# Patient Record
Sex: MALE | Race: WHITE | Employment: FULL TIME | URBAN - METROPOLITAN AREA
[De-identification: names, ages, dates, MRNs, and addresses within clinical notes are randomized per-mention and may not be internally consistent; named-entity substitution may affect disease eponyms.]

---

## 2017-02-06 ENCOUNTER — COMMUNICATION - HEALTHEAST (OUTPATIENT)
Dept: FAMILY MEDICINE | Facility: CLINIC | Age: 42
End: 2017-02-06

## 2017-03-28 ENCOUNTER — OFFICE VISIT - HEALTHEAST (OUTPATIENT)
Dept: FAMILY MEDICINE | Facility: CLINIC | Age: 42
End: 2017-03-28

## 2017-03-28 ENCOUNTER — AMBULATORY - HEALTHEAST (OUTPATIENT)
Dept: FAMILY MEDICINE | Facility: CLINIC | Age: 42
End: 2017-03-28

## 2017-03-28 DIAGNOSIS — F41.1 ANXIETY STATE: ICD-10-CM

## 2017-03-28 DIAGNOSIS — Z00.00 ROUTINE GENERAL MEDICAL EXAMINATION AT A HEALTH CARE FACILITY: ICD-10-CM

## 2017-03-28 LAB
CHOLEST SERPL-MCNC: 182 MG/DL
FASTING STATUS PATIENT QL REPORTED: YES
HDLC SERPL-MCNC: 51 MG/DL
LDLC SERPL CALC-MCNC: 114 MG/DL
TRIGL SERPL-MCNC: 84 MG/DL

## 2017-03-28 ASSESSMENT — MIFFLIN-ST. JEOR: SCORE: 1959.01

## 2017-07-05 ENCOUNTER — COMMUNICATION - HEALTHEAST (OUTPATIENT)
Dept: FAMILY MEDICINE | Facility: CLINIC | Age: 42
End: 2017-07-05

## 2017-07-05 DIAGNOSIS — F41.9 ANXIETY AND DEPRESSION: ICD-10-CM

## 2017-07-05 DIAGNOSIS — F32.A ANXIETY AND DEPRESSION: ICD-10-CM

## 2017-10-17 ENCOUNTER — COMMUNICATION - HEALTHEAST (OUTPATIENT)
Dept: FAMILY MEDICINE | Facility: CLINIC | Age: 42
End: 2017-10-17

## 2017-10-17 DIAGNOSIS — F41.9 ANXIETY AND DEPRESSION: ICD-10-CM

## 2017-10-17 DIAGNOSIS — F32.A ANXIETY AND DEPRESSION: ICD-10-CM

## 2018-01-26 ENCOUNTER — COMMUNICATION - HEALTHEAST (OUTPATIENT)
Dept: FAMILY MEDICINE | Facility: CLINIC | Age: 43
End: 2018-01-26

## 2018-01-26 DIAGNOSIS — F32.A ANXIETY AND DEPRESSION: ICD-10-CM

## 2018-01-26 DIAGNOSIS — F41.9 ANXIETY AND DEPRESSION: ICD-10-CM

## 2018-02-27 ENCOUNTER — COMMUNICATION - HEALTHEAST (OUTPATIENT)
Dept: FAMILY MEDICINE | Facility: CLINIC | Age: 43
End: 2018-02-27

## 2018-02-27 DIAGNOSIS — F32.A ANXIETY AND DEPRESSION: ICD-10-CM

## 2018-02-27 DIAGNOSIS — F41.9 ANXIETY AND DEPRESSION: ICD-10-CM

## 2018-04-17 ENCOUNTER — COMMUNICATION - HEALTHEAST (OUTPATIENT)
Dept: FAMILY MEDICINE | Facility: CLINIC | Age: 43
End: 2018-04-17

## 2018-04-17 DIAGNOSIS — F32.A ANXIETY AND DEPRESSION: ICD-10-CM

## 2018-04-17 DIAGNOSIS — F41.9 ANXIETY AND DEPRESSION: ICD-10-CM

## 2018-08-05 ENCOUNTER — COMMUNICATION - HEALTHEAST (OUTPATIENT)
Dept: FAMILY MEDICINE | Facility: CLINIC | Age: 43
End: 2018-08-05

## 2018-08-05 DIAGNOSIS — F32.A ANXIETY AND DEPRESSION: ICD-10-CM

## 2018-08-05 DIAGNOSIS — F41.9 ANXIETY AND DEPRESSION: ICD-10-CM

## 2018-11-20 ENCOUNTER — COMMUNICATION - HEALTHEAST (OUTPATIENT)
Dept: FAMILY MEDICINE | Facility: CLINIC | Age: 43
End: 2018-11-20

## 2018-11-20 DIAGNOSIS — F41.9 ANXIETY AND DEPRESSION: ICD-10-CM

## 2018-11-20 DIAGNOSIS — F32.A ANXIETY AND DEPRESSION: ICD-10-CM

## 2019-01-22 ENCOUNTER — OFFICE VISIT - HEALTHEAST (OUTPATIENT)
Dept: FAMILY MEDICINE | Facility: CLINIC | Age: 44
End: 2019-01-22

## 2019-01-22 ENCOUNTER — AMBULATORY - HEALTHEAST (OUTPATIENT)
Dept: PHARMACY | Facility: CLINIC | Age: 44
End: 2019-01-22

## 2019-01-22 DIAGNOSIS — F32.A ANXIETY AND DEPRESSION: ICD-10-CM

## 2019-01-22 DIAGNOSIS — Z00.00 ROUTINE GENERAL MEDICAL EXAMINATION AT A HEALTH CARE FACILITY: ICD-10-CM

## 2019-01-22 DIAGNOSIS — F41.9 ANXIETY AND DEPRESSION: ICD-10-CM

## 2019-01-22 LAB
CHOLEST SERPL-MCNC: 166 MG/DL
FASTING STATUS PATIENT QL REPORTED: YES
FASTING STATUS PATIENT QL REPORTED: YES
GLUCOSE BLD-MCNC: 70 MG/DL (ref 70–125)
HDLC SERPL-MCNC: 57 MG/DL
LDLC SERPL CALC-MCNC: 95 MG/DL
TRIGL SERPL-MCNC: 70 MG/DL

## 2019-01-22 RX ORDER — EPINEPHRINE 0.3 MG/.3ML
0.3 INJECTION SUBCUTANEOUS
Status: SHIPPED | COMMUNITY
Start: 2018-12-25 | End: 2022-01-31

## 2019-01-22 ASSESSMENT — MIFFLIN-ST. JEOR: SCORE: 1932.24

## 2020-03-01 ENCOUNTER — COMMUNICATION - HEALTHEAST (OUTPATIENT)
Dept: FAMILY MEDICINE | Facility: CLINIC | Age: 45
End: 2020-03-01

## 2020-03-01 DIAGNOSIS — F32.A ANXIETY AND DEPRESSION: ICD-10-CM

## 2020-03-01 DIAGNOSIS — F41.9 ANXIETY AND DEPRESSION: ICD-10-CM

## 2020-06-19 ENCOUNTER — COMMUNICATION - HEALTHEAST (OUTPATIENT)
Dept: FAMILY MEDICINE | Facility: CLINIC | Age: 45
End: 2020-06-19

## 2020-06-19 DIAGNOSIS — F32.A ANXIETY AND DEPRESSION: ICD-10-CM

## 2020-06-19 DIAGNOSIS — F41.9 ANXIETY AND DEPRESSION: ICD-10-CM

## 2020-09-21 ENCOUNTER — COMMUNICATION - HEALTHEAST (OUTPATIENT)
Dept: FAMILY MEDICINE | Facility: CLINIC | Age: 45
End: 2020-09-21

## 2020-09-21 DIAGNOSIS — F41.9 ANXIETY AND DEPRESSION: ICD-10-CM

## 2020-09-21 DIAGNOSIS — F32.A ANXIETY AND DEPRESSION: ICD-10-CM

## 2020-12-21 ENCOUNTER — COMMUNICATION - HEALTHEAST (OUTPATIENT)
Dept: FAMILY MEDICINE | Facility: CLINIC | Age: 45
End: 2020-12-21

## 2020-12-21 DIAGNOSIS — F32.A ANXIETY AND DEPRESSION: ICD-10-CM

## 2020-12-21 DIAGNOSIS — F41.9 ANXIETY AND DEPRESSION: ICD-10-CM

## 2020-12-22 ENCOUNTER — COMMUNICATION - HEALTHEAST (OUTPATIENT)
Dept: FAMILY MEDICINE | Facility: CLINIC | Age: 45
End: 2020-12-22

## 2020-12-22 DIAGNOSIS — F32.A ANXIETY AND DEPRESSION: ICD-10-CM

## 2020-12-22 DIAGNOSIS — F41.9 ANXIETY AND DEPRESSION: ICD-10-CM

## 2021-01-15 ENCOUNTER — OFFICE VISIT - HEALTHEAST (OUTPATIENT)
Dept: FAMILY MEDICINE | Facility: CLINIC | Age: 46
End: 2021-01-15

## 2021-01-15 DIAGNOSIS — F32.A ANXIETY AND DEPRESSION: ICD-10-CM

## 2021-01-15 DIAGNOSIS — M79.89 SOFT TISSUE MASS: ICD-10-CM

## 2021-01-15 DIAGNOSIS — F41.9 ANXIETY AND DEPRESSION: ICD-10-CM

## 2021-01-15 DIAGNOSIS — Z00.00 ROUTINE GENERAL MEDICAL EXAMINATION AT A HEALTH CARE FACILITY: ICD-10-CM

## 2021-01-15 LAB
CHOLEST SERPL-MCNC: 158 MG/DL
FASTING STATUS PATIENT QL REPORTED: YES
FASTING STATUS PATIENT QL REPORTED: YES
GLUCOSE BLD-MCNC: 75 MG/DL (ref 70–125)
HDLC SERPL-MCNC: 57 MG/DL
LDLC SERPL CALC-MCNC: 89 MG/DL
TRIGL SERPL-MCNC: 61 MG/DL

## 2021-01-15 RX ORDER — CITALOPRAM HYDROBROMIDE 20 MG/1
20 TABLET ORAL DAILY
Qty: 90 TABLET | Refills: 3 | Status: SHIPPED | OUTPATIENT
Start: 2021-01-15 | End: 2022-01-18

## 2021-01-15 ASSESSMENT — MIFFLIN-ST. JEOR: SCORE: 1929.52

## 2021-01-21 ASSESSMENT — PATIENT HEALTH QUESTIONNAIRE - PHQ9: SUM OF ALL RESPONSES TO PHQ QUESTIONS 1-9: 1

## 2021-05-27 ENCOUNTER — RECORDS - HEALTHEAST (OUTPATIENT)
Dept: ADMINISTRATIVE | Facility: CLINIC | Age: 46
End: 2021-05-27

## 2021-05-27 ASSESSMENT — PATIENT HEALTH QUESTIONNAIRE - PHQ9: SUM OF ALL RESPONSES TO PHQ QUESTIONS 1-9: 1

## 2021-05-28 ENCOUNTER — RECORDS - HEALTHEAST (OUTPATIENT)
Dept: ADMINISTRATIVE | Facility: CLINIC | Age: 46
End: 2021-05-28

## 2021-05-30 VITALS — BODY MASS INDEX: 32.62 KG/M2 | HEIGHT: 71 IN | WEIGHT: 233 LBS

## 2021-05-31 ENCOUNTER — RECORDS - HEALTHEAST (OUTPATIENT)
Dept: ADMINISTRATIVE | Facility: CLINIC | Age: 46
End: 2021-05-31

## 2021-06-02 VITALS — WEIGHT: 230.6 LBS | BODY MASS INDEX: 33.01 KG/M2 | HEIGHT: 70 IN

## 2021-06-05 VITALS
BODY MASS INDEX: 32.93 KG/M2 | DIASTOLIC BLOOD PRESSURE: 68 MMHG | WEIGHT: 230 LBS | OXYGEN SATURATION: 99 % | HEART RATE: 63 BPM | HEIGHT: 70 IN | SYSTOLIC BLOOD PRESSURE: 112 MMHG

## 2021-06-06 NOTE — TELEPHONE ENCOUNTER
RN cannot approve Refill Request    RN can NOT refill this medication Protocol failed and NO refill given.      Lucía Lindsay, [facility] Triage/Med Refill 3/2/2020    Requested Prescriptions   Pending Prescriptions Disp Refills     citalopram (CELEXA) 20 MG tablet [Pharmacy Med Name: CITALOPRAM 20MG TABLETS] 90 tablet 3     Sig: TAKE 1 TABLET(20 MG) BY MOUTH DAILY       [facility] Refill Protocol  Failed - 3/1/2020  7:26 PM        Failed - PCP or prescribing provider visit in last year     Last office visit with prescriber/PCP: Visit date not found OR same dept: Visit date not found OR same specialty: Visit date not found  Last physical: 1/22/2019 Last MTM visit: Visit date not found   Next visit within 3 mo: Visit date not found  Next physical within 3 mo: Visit date not found  Prescriber OR PCP: Sundar Vickers MD  Last diagnosis associated with med order: 1. Anxiety and depression  - citalopram (CELEXA) 20 MG tablet [Pharmacy Med Name: CITALOPRAM 20MG TABLETS]; TAKE 1 TABLET(20 MG) BY MOUTH DAILY  Dispense: 90 tablet; Refill: 3    If protocol passes may refill for 12 months if within 3 months of last provider visit (or a total of 15 months).

## 2021-06-09 NOTE — PROGRESS NOTES
" Patient ID: Tristan Syed is a 41 y.o. male.  /78  Pulse 61  Ht 5' 11\" (1.803 m)  Wt (!) 233 lb (105.7 kg)  SpO2 99%  BMI 32.5 kg/m2    Assessment/Plan:                   Diagnoses and all orders for this visit:    Routine general medical examination at a health care facility  -     Lipid Cascade FASTING  -     Glucose; Future  -     Glucose    Anxiety  -     Ambulatory referral to Psychology           DISCUSSION  Increase citalopram to 20 mg once daily.  Referral for cognitive behavioral therapy.  Obtain labs as above.  See additional discussion below.  Subjective:     HPI    Tristan Syed is a 41-year-old man whose medical history includes anxiety and depression on citalopram.  He is here today for a physical.  One month ago was seen in the emergency department with a elevation of blood pressure and a strange facial sensation.  He was evaluated and thought maybe his symptoms were due to a food reaction or anxiety.  Minimal workup was undertaken but it has not recurred.  We discussed this briefly today.  He does have some pain in the right foot near the MTP joint.  He reports a strange sensation in his neck when he turns his head and swallows.  He has a bump on the left shin.    Reviewed his symptomatic concerns.  We discussed his health history, family history and social history.  We discussed management of anxiety depression.  Anxiety is still a significant factor.  He is on low-dose citalopram.  We did discussion today regarding increasing the dose.  We also discussed referral for cognitive behavioral therapy.    Right foot pain in the right MTP joint for about 6 months.  No history of injury no bruising redness or swelling.    Is a cystlike structure on the left anterior shin that has been present for several years he just wants to have this reevaluated.      Review of Systems  Complete review of systems is obtained.  Other than the specific considerations noted above complete review of " "systems is negative.      Objective:   Medications:  Current Outpatient Prescriptions   Medication Sig     cetirizine (ZYRTEC) 10 MG tablet Take 10 mg by mouth daily.     citalopram (CELEXA) 20 MG tablet TAKE 1 TABLET BY MOUTH EVERY DAY (Patient taking differently: take 1/2 tablet daily)     MULTIVIT-MINS/IRON/FOLIC/LYCOP (CENTRUM ULTRA MEN'S ORAL) Take by mouth.     Allergies:  Allergies   Allergen Reactions     Augmentin [Amoxicillin-Pot Clavulanate] Nausea And Vomiting     Tobacco:   reports that he has never smoked. He does not have any smokeless tobacco history on file.    HEALTH PREVENTION    General  Dental care: Discussed the importance of regular dental care.  Eye care: Discussed importance of routine eye exams for glaucoma screening      Wt Readings from Last 3 Encounters:   03/28/17 (!) 233 lb (105.7 kg)   02/26/17 (!) 230 lb (104.3 kg)   12/10/15 (!) 225 lb (102.1 kg)     Body mass index is 32.5 kg/(m^2).        Cancer screening  Testicular cancer:is discussed and exam performed today  Skin cancer: Discussed sun burn prevention and self monitoring.      Cholesterol:   LDL Calculated (mg/dL)   Date Value   06/23/2015 101      Blood Pressure:   BP Readings from Last 3 Encounters:   03/28/17 122/78   02/26/17 148/76   01/01/16 145/81     Immunization History   Administered Date(s) Administered     DT (pediatric) 01/01/2004     Td, historic 01/01/2004     Tdap 10/20/2009     There are no preventive care reminders to display for this patient.     Physical Exam      /78  Pulse 61  Ht 5' 11\" (1.803 m)  Wt (!) 233 lb (105.7 kg)  SpO2 99%  BMI 32.5 kg/m2    General Appearance:    Alert, cooperative, no distress, appears stated age   Head:    Normocephalic, without obvious abnormality, atraumatic   Eyes:    PERRL, conjunctiva/corneas clear, EOM's intact       Ears:    Normal TM's and external ear canals, both ears   Nose:   Nares normal, septum midline, mucosa normal, no drainage    or sinus tenderness "   Throat:   Lips, mucosa, and tongue normal; teeth and gums normal   Neck:   Supple, symmetrical, trachea midline, no adenopathy;        thyroid:  No enlargement/tenderness/nodules   Lungs:     Clear to auscultation bilaterally, respirations unlabored   Chest wall/ Breast:    No tenderness or deformity   Heart:    Regular rate and rhythm, S1 and S2 normal, no murmur, rub   or gallop   Abdomen:     Soft, non-tender, bowel sounds active all four quadrants,     no masses, no organomegaly   Genitalia:   normal testicular anatomy no inguinal hernias.     Extremities:   Extremities normal, atraumatic, no cyanosis or edema   Pulses:   2+ and symmetric all extremities   Skin:  cystic structure on the left anterior shin, seems to be within the skin itself, it is smooth round freely movable.     Neurologic:   CNII-XII intact. Normal strength, sensation and reflexes       throughout

## 2021-06-11 NOTE — TELEPHONE ENCOUNTER
RN cannot approve Refill Request    RN can NOT refill this medication PCP messaged that patient is overdue for Office Visit. Last office visit: Visit date not found Last Physical: 1/22/2019 Last MTM visit: Visit date not found Last visit same specialty: Visit date not found.  Next visit within 3 mo: Visit date not found  Next physical within 3 mo: Visit date not found      Lianna Grewal, [facility] Triage/Med Refill 9/23/2020    Requested Prescriptions   Pending Prescriptions Disp Refills     citalopram (CELEXA) 20 MG tablet 90 tablet 0     Sig: Take 1 tablet (20 mg total) by mouth daily.       [facility] Refill Protocol  Failed - 9/21/2020 10:10 AM        Failed - PCP or prescribing provider visit in last year     Last office visit with prescriber/PCP: Visit date not found OR same dept: Visit date not found OR same specialty: Visit date not found  Last physical: 1/22/2019 Last MTM visit: Visit date not found   Next visit within 3 mo: Visit date not found  Next physical within 3 mo: Visit date not found  Prescriber OR PCP: Sundar Vickers MD  Last diagnosis associated with med order: 1. Anxiety and depression  - citalopram (CELEXA) 20 MG tablet; Take 1 tablet (20 mg total) by mouth daily.  Dispense: 90 tablet; Refill: 0    If protocol passes may refill for 12 months if within 3 months of last provider visit (or a total of 15 months).

## 2021-06-14 NOTE — TELEPHONE ENCOUNTER
RN cannot approve Refill Request    RN can NOT refill this medication PCP messaged that patient is overdue for Office Visit. Last office visit: Visit date not found Last Physical: 1/22/2019 Last MTM visit: Visit date not found Last visit same specialty: Visit date not found.  Next visit within 3 mo: Visit date not found  Next physical within 3 mo: Visit date not found      Lianna Grewal, [facility] Triage/Med Refill 12/24/2020    Requested Prescriptions   Pending Prescriptions Disp Refills     citalopram (CELEXA) 20 MG tablet 90 tablet 0     Sig: Take 1 tablet (20 mg total) by mouth daily.       [facility] Refill Protocol  Failed - 12/21/2020  2:52 PM        Failed - PCP or prescribing provider visit in last year     Last office visit with prescriber/PCP: Visit date not found OR same dept: Visit date not found OR same specialty: Visit date not found  Last physical: 1/22/2019 Last MTM visit: Visit date not found   Next visit within 3 mo: Visit date not found  Next physical within 3 mo: Visit date not found  Prescriber OR PCP: Sundar Vickers MD  Last diagnosis associated with med order: 1. Anxiety and depression  - citalopram (CELEXA) 20 MG tablet; Take 1 tablet (20 mg total) by mouth daily.  Dispense: 90 tablet; Refill: 0    If protocol passes may refill for 12 months if within 3 months of last provider visit (or a total of 15 months).

## 2021-06-14 NOTE — TELEPHONE ENCOUNTER
Patient has not been seen for over a year. Will send a one month supply then patient. Patient needs to set up a follow up appt.    Responded to pt via [employer].  Confirm pt reads.    Electronically signed by: Maria Eugenia Lara MD  4/8/2020

## 2021-06-14 NOTE — PROGRESS NOTES
" Patient ID: Tristan Syed is a 45 y.o. male.  /68   Pulse 63   Ht 5' 10\" (1.778 m)   Wt (!) 230 lb (104.3 kg)   SpO2 99%   BMI 33.00 kg/m      Assessment/Plan:                Diagnoses and all orders for this visit:    Routine general medical examination at a health care facility  -     Lipid Cascade  -     Glucose    Anxiety and depression  -     citalopram (CELEXA) 20 MG tablet; Take 1 tablet (20 mg total) by mouth daily.  Dispense: 90 tablet; Refill: 3    Other orders  -     Cancel: Td, Preservative Free (green label)      DISCUSSION  Check labs as above.  See additional discussion below.  Continue citalopram.  Subjective:     HPI    Tristan Syed is a generally healthy 45-year-old male.  He has a history of anxiety depression.  He takes citalopram.  Reports medication works well in conjunction with self-care including regular exercise to manage his symptoms.  PHQ-9 score today is 1.  Reports dry mouth as a side effect of citalopram which is not a significant concern for him.  He has had typically favorable cholesterol test results.  He has no history of diabetes.  Blood pressure has been ideal.  Weight is higher than ideal with body mass index consistently of just over 30.  Today we discussed considerations related to nutrition and exercise as a means to reduce weight.  Discussed immunizations, he would be due for influenza vaccine declines.  Discussed considerations related to Covid vaccine.  Discussed other routine health preventive measures no indication to initiate prostate cancer or colon cancer screening at this time.    He did bring up some concerns regarding acid reflux symptoms and we discussed dietary considerations specifically related to this as well as potentially the use of over-the-counter medications if needed.  Also discussed the potential serious nature of such symptoms if they are persistent or severe and these would require follow-up.      Is a longstanding cystic type " structure in the left anterior shin, it causes him some discomfort although it has been present and minimally changed in size he would like to have it removed.      Review of Systems  Complete review of systems is obtained.  Other than the specific considerations noted above complete review of systems is negative.          Objective:   Medications:  Current Outpatient Medications   Medication Sig     cetirizine (ZYRTEC) 10 MG tablet Take 10 mg by mouth daily.     citalopram (CELEXA) 20 MG tablet Take 1 tablet (20 mg total) by mouth daily.     MULTIVIT-MINS/IRON/FOLIC/LYCOP (CENTRUM ULTRA MEN'S ORAL) Take by mouth.     EPINEPHrine (EPIPEN 2-PELON) 0.3 mg/0.3 mL injection Inject 0.3 mg into the shoulder, thigh, or buttocks.       Allergies:  Allergies   Allergen Reactions     Augmentin [Amoxicillin-Pot Clavulanate] Nausea And Vomiting     Penicillins Nausea And Vomiting       Tobacco:   reports that he has never smoked. He has never used smokeless tobacco.    HEALTH PREVENTION    General  Dental care: Discussed the importance of regular dental care.  Eye care: Discussed importance of routine eye exams for glaucoma screening  Exercise: Discussed maintaining his regular exercise regiment.  Diet: Discussed [place] ways he can refine his diet to initiate weight loss.    Wt Readings from Last 3 Encounters:   01/15/21 (!) 230 lb (104.3 kg)   01/22/19 (!) 230 lb 9.6 oz (104.6 kg)   03/28/17 (!) 233 lb (105.7 kg)     Body mass index is 33 kg/m .    The following high BMI interventions were performed this visit: encouragement to exercise    Cholesterol:   LDL Calculated (mg/dL)   Date Value   01/22/2019 95   03/28/2017 114   06/23/2015 101      Blood Pressure:   BP Readings from Last 3 Encounters:   01/15/21 112/68   01/22/19 122/74   03/28/17 122/78     Immunization History   Administered Date(s) Administered     DT (pediatric) 01/01/2004     Td, Adult, Absorbed 01/01/2004     Td,adult,historic,unspecified 01/01/2004     Tdap  "10/20/2009, 08/01/2018     There are no preventive care reminders to display for this patient.     Physical Exam      /68   Pulse 63   Ht 5' 10\" (1.778 m)   Wt (!) 230 lb (104.3 kg)   SpO2 99%   BMI 33.00 kg/m      General Appearance:    Alert, cooperative, no distress   Eyes:   No scleral icterus or conjunctival irritation       Ears:    Normal TM's and external ear canals, both ears   Throat:   Lips, mucosa, and tongue normal; teeth and gums normal   Neck:   Supple, symmetrical, trachea midline, no adenopathy;        thyroid:  No enlargement/tenderness/nodules   Lungs:     Clear to auscultation bilaterally, respirations unlabored, no wheezes or crackles   Heart:    Regular rate and rhythm,  No murmur   Abdomen:    Soft, no distention, no tenderness on palpation, no masses, no organomegaly     Extremities:  No edema, no joint swelling or redness, no evidence of any injuries   Skin:  No concerning skin findings, no suspicious moles, no rashes   Neurologic:  On gross examination there is no motor or sensory deficit.  Patient walks with a normal gait                                   "

## 2021-06-23 NOTE — PROGRESS NOTES
" Patient ID: Tristan Syed is a 43 y.o. male.  /74   Pulse 66   Ht 5' 10\" (1.778 m)   Wt (!) 230 lb 9.6 oz (104.6 kg)   BMI 33.09 kg/m      Assessment/Plan:                   Diagnoses and all orders for this visit:    Routine general medical examination at a health care facility  -     Lipid Cascade  -     Glucose    Anxiety and depression  -     citalopram (CELEXA) 20 MG tablet  Dispense: 90 tablet; Refill: 3           DISCUSSION  Continue citalopram.  Obtain labs as above.  See additional discussion below.  Subjective:     HPI    Tristan Syed is a generally healthy 43-year-old male he is here today for a physical.  He has anxiety takes citalopram 20 mg daily.  Reports good control of anxiety symptoms.  No indication for change of medication or additional therapies at this time.  He has had favorable cholesterol and blood sugar readings as well as favorable blood pressure readings.  He does remain active.  He does not smoke.  Works a physical job.  Gets regular exercise in addition to his active job.  Tries to eat a healthy balanced diet dietary improvement as discussed.  BMI is elevated discussed the importance of continued efforts for increased exercise and improved nutrition to achieve weight loss.    Reports minor musculoskeletal concerns including mild intermittent shoulder pain without any currently.  Chronic mild waxing and waning left lateral epicondylitis symptoms.  Discussed management of these concerns briefly as well as management of occasional low back stiffness.      Review of Systems  Complete review of systems is obtained.  Other than the specific considerations noted above complete review of systems is negative.          Objective:   Medications:  Current Outpatient Medications   Medication Sig     cetirizine (ZYRTEC) 10 MG tablet Take 10 mg by mouth daily.     citalopram (CELEXA) 20 MG tablet Take 1 tablet (20 mg total) by mouth daily.     EPINEPHrine (EPIPEN 2-PELON) 0.3 mg/0.3 mL " "injection Inject 0.3 mg into the shoulder, thigh, or buttocks.     MULTIVIT-MINS/IRON/FOLIC/LYCOP (CENTRUM ULTRA MEN'S ORAL) Take by mouth.       Allergies:  Allergies   Allergen Reactions     Augmentin [Amoxicillin-Pot Clavulanate] Nausea And Vomiting     Penicillins Nausea And Vomiting       Tobacco:   reports that  has never smoked. he has never used smokeless tobacco.    HEALTH PREVENTION    General  Dental care: Discussed the importance of regular dental care.  Eye care: Discussed importance of routine eye exams for glaucoma screening  Exercise: Discussed maintaining and trying to increase the frequency of exercise  Diet: Discussed ways he can improve his diet, specifically discussed overall calorie intake, carbohydrate and sugar intake and incorporating more fruits and vegetables.    Wt Readings from Last 3 Encounters:   01/22/19 (!) 230 lb 9.6 oz (104.6 kg)   03/28/17 (!) 233 lb (105.7 kg)   02/26/17 (!) 230 lb (104.3 kg)     Body mass index is 33.09 kg/m .    The following high BMI interventions were performed this visit: encouragement to exercise      Cholesterol:   LDL Calculated (mg/dL)   Date Value   03/28/2017 114   06/23/2015 101      Blood Pressure:   BP Readings from Last 3 Encounters:   01/22/19 122/74   03/28/17 122/78   02/26/17 148/76       Immunization History   Administered Date(s) Administered     DT (pediatric) 01/01/2004     Td, Adult, Absorbed 01/01/2004     Td,adult,historic,unspecified 01/01/2004     Tdap 10/20/2009     There are no preventive care reminders to display for this patient.     Physical Exam      /74   Pulse 66   Ht 5' 10\" (1.778 m)   Wt (!) 230 lb 9.6 oz (104.6 kg)   BMI 33.09 kg/m          General Appearance:    Alert, cooperative, no distress   Eyes:   No scleral icterus or conjunctival irritation       Ears:    Normal TM's and external ear canals, both ears   Throat:   Lips, mucosa, and tongue normal; teeth and gums normal   Neck:   Supple, symmetrical, trachea " midline, no adenopathy;        thyroid:  No enlargement/tenderness/nodules   Lungs:     Clear to auscultation bilaterally, respirations unlabored, no wheezes or crackles   Heart:    Regular rate and rhythm,  No murmur   Abdomen:    Soft, no distention, no tenderness on palpation, no masses, no organomegaly     Extremities:  No edema, no joint swelling or redness, no evidence of any injuries   Skin:  No concerning skin findings, no suspicious moles, no rashes   Neurologic:  On gross examination there is no motor or sensory deficit.  Patient walks with a normal gait

## 2021-09-12 ENCOUNTER — HEALTH MAINTENANCE LETTER (OUTPATIENT)
Age: 46
End: 2021-09-12

## 2021-09-27 ENCOUNTER — TRANSFERRED RECORDS (OUTPATIENT)
Dept: HEALTH INFORMATION MANAGEMENT | Facility: CLINIC | Age: 46
End: 2021-09-27

## 2021-11-20 ENCOUNTER — HOSPITAL ENCOUNTER (EMERGENCY)
Facility: HOSPITAL | Age: 46
Discharge: HOME | End: 2021-11-20
Attending: EMERGENCY MEDICINE | Admitting: EMERGENCY MEDICINE
Payer: COMMERCIAL

## 2021-11-20 VITALS
HEART RATE: 80 BPM | BODY MASS INDEX: 33.64 KG/M2 | RESPIRATION RATE: 16 BRPM | DIASTOLIC BLOOD PRESSURE: 95 MMHG | HEIGHT: 70 IN | OXYGEN SATURATION: 98 % | SYSTOLIC BLOOD PRESSURE: 136 MMHG | WEIGHT: 235 LBS | TEMPERATURE: 97.9 F

## 2021-11-20 DIAGNOSIS — U07.1 INFECTION DUE TO 2019 NOVEL CORONAVIRUS: ICD-10-CM

## 2021-11-20 DIAGNOSIS — R11.2 NON-INTRACTABLE VOMITING WITH NAUSEA, UNSPECIFIED VOMITING TYPE: ICD-10-CM

## 2021-11-20 LAB
ALBUMIN SERPL-MCNC: 3.7 G/DL (ref 3.5–5)
ALBUMIN UR-MCNC: NEGATIVE MG/DL
ALP SERPL-CCNC: 94 U/L (ref 45–120)
ALT SERPL W P-5'-P-CCNC: 18 U/L (ref 0–45)
ANION GAP SERPL CALCULATED.3IONS-SCNC: 6 MMOL/L (ref 5–18)
APPEARANCE UR: CLEAR
AST SERPL W P-5'-P-CCNC: 19 U/L (ref 0–40)
BASOPHILS # BLD AUTO: 0 10E3/UL (ref 0–0.2)
BASOPHILS NFR BLD AUTO: 0 %
BILIRUB SERPL-MCNC: 1.1 MG/DL (ref 0–1)
BILIRUB UR QL STRIP: NEGATIVE
BUN SERPL-MCNC: 11 MG/DL (ref 8–22)
CALCIUM SERPL-MCNC: 9.1 MG/DL (ref 8.5–10.5)
CHLORIDE BLD-SCNC: 105 MMOL/L (ref 98–107)
CO2 SERPL-SCNC: 25 MMOL/L (ref 22–31)
COLOR UR AUTO: ABNORMAL
CREAT SERPL-MCNC: 0.94 MG/DL (ref 0.7–1.3)
EOSINOPHIL # BLD AUTO: 0 10E3/UL (ref 0–0.7)
EOSINOPHIL NFR BLD AUTO: 0 %
ERYTHROCYTE [DISTWIDTH] IN BLOOD BY AUTOMATED COUNT: 12.3 % (ref 10–15)
GFR SERPL CREATININE-BSD FRML MDRD: >90 ML/MIN/1.73M2
GLUCOSE BLD-MCNC: 107 MG/DL (ref 70–125)
GLUCOSE UR STRIP-MCNC: NEGATIVE MG/DL
HCT VFR BLD AUTO: 48 % (ref 40–53)
HGB BLD-MCNC: 16.6 G/DL (ref 13.3–17.7)
HGB UR QL STRIP: ABNORMAL
IMM GRANULOCYTES # BLD: 0 10E3/UL
IMM GRANULOCYTES NFR BLD: 0 %
KETONES UR STRIP-MCNC: 60 MG/DL
LEUKOCYTE ESTERASE UR QL STRIP: NEGATIVE
LYMPHOCYTES # BLD AUTO: 0.5 10E3/UL (ref 0.8–5.3)
LYMPHOCYTES NFR BLD AUTO: 14 %
MCH RBC QN AUTO: 28 PG (ref 26.5–33)
MCHC RBC AUTO-ENTMCNC: 34.6 G/DL (ref 31.5–36.5)
MCV RBC AUTO: 81 FL (ref 78–100)
MONOCYTES # BLD AUTO: 0.2 10E3/UL (ref 0–1.3)
MONOCYTES NFR BLD AUTO: 7 %
MUCOUS THREADS #/AREA URNS LPF: PRESENT /LPF
NEUTROPHILS # BLD AUTO: 2.8 10E3/UL (ref 1.6–8.3)
NEUTROPHILS NFR BLD AUTO: 79 %
NITRATE UR QL: NEGATIVE
NRBC # BLD AUTO: 0 10E3/UL
NRBC BLD AUTO-RTO: 0 /100
PH UR STRIP: 5.5 [PH] (ref 5–7)
PLATELET # BLD AUTO: 138 10E3/UL (ref 150–450)
POTASSIUM BLD-SCNC: 4 MMOL/L (ref 3.5–5)
PROT SERPL-MCNC: 6.8 G/DL (ref 6–8)
RBC # BLD AUTO: 5.92 10E6/UL (ref 4.4–5.9)
RBC URINE: <1 /HPF
SODIUM SERPL-SCNC: 136 MMOL/L (ref 136–145)
SP GR UR STRIP: 1.02 (ref 1–1.03)
UROBILINOGEN UR STRIP-MCNC: <2 MG/DL
WBC # BLD AUTO: 3.6 10E3/UL (ref 4–11)
WBC URINE: 1 /HPF

## 2021-11-20 PROCEDURE — 96374 THER/PROPH/DIAG INJ IV PUSH: CPT

## 2021-11-20 PROCEDURE — 80053 COMPREHEN METABOLIC PANEL: CPT | Performed by: EMERGENCY MEDICINE

## 2021-11-20 PROCEDURE — 36415 COLL VENOUS BLD VENIPUNCTURE: CPT | Performed by: EMERGENCY MEDICINE

## 2021-11-20 PROCEDURE — 85025 COMPLETE CBC W/AUTO DIFF WBC: CPT | Performed by: EMERGENCY MEDICINE

## 2021-11-20 PROCEDURE — 250N000011 HC RX IP 250 OP 636: Performed by: EMERGENCY MEDICINE

## 2021-11-20 PROCEDURE — 258N000003 HC RX IP 258 OP 636: Performed by: EMERGENCY MEDICINE

## 2021-11-20 PROCEDURE — 99284 EMERGENCY DEPT VISIT MOD MDM: CPT | Mod: 25

## 2021-11-20 PROCEDURE — 81001 URINALYSIS AUTO W/SCOPE: CPT | Performed by: EMERGENCY MEDICINE

## 2021-11-20 PROCEDURE — 96361 HYDRATE IV INFUSION ADD-ON: CPT

## 2021-11-20 RX ORDER — ONDANSETRON 4 MG/1
4 TABLET, ORALLY DISINTEGRATING ORAL EVERY 8 HOURS PRN
Qty: 10 TABLET | Refills: 0 | Status: SHIPPED | OUTPATIENT
Start: 2021-11-20 | End: 2021-11-23

## 2021-11-20 RX ORDER — FAMOTIDINE 20 MG/1
20 TABLET, FILM COATED ORAL 2 TIMES DAILY
Qty: 20 TABLET | Refills: 0 | Status: SHIPPED | OUTPATIENT
Start: 2021-11-20 | End: 2021-11-30

## 2021-11-20 RX ORDER — ONDANSETRON 4 MG/1
4 TABLET, ORALLY DISINTEGRATING ORAL ONCE
Status: DISCONTINUED | OUTPATIENT
Start: 2021-11-20 | End: 2021-11-20

## 2021-11-20 RX ORDER — ONDANSETRON 2 MG/ML
4 INJECTION INTRAMUSCULAR; INTRAVENOUS ONCE
Status: COMPLETED | OUTPATIENT
Start: 2021-11-20 | End: 2021-11-20

## 2021-11-20 RX ADMIN — ONDANSETRON 4 MG: 2 INJECTION INTRAMUSCULAR; INTRAVENOUS at 20:13

## 2021-11-20 RX ADMIN — SODIUM CHLORIDE 1000 ML: 9 INJECTION, SOLUTION INTRAVENOUS at 20:14

## 2021-11-20 ASSESSMENT — MIFFLIN-ST. JEOR: SCORE: 1947.2

## 2021-11-21 NOTE — ED PROVIDER NOTES
EMERGENCY DEPARTMENT ENCOUnter      NAME: Tristan Syed  AGE: 46 year old male  YOB: 1975  MRN: [id number]  EVALUATION DATE & TIME: 2021  7:45 PM    PCP: Sundar Vickers    ED PROVIDER: Irvin Lerma DO      Chief Complaint   Patient presents with     Nausea, Vomiting, & Diarrhea     COVID positive         FINAL IMPRESSION:  1. Infection due to 2019 novel coronavirus    2. Non-intractable vomiting with nausea, unspecified vomiting type          ED COURSE & MEDICAL DECISION MAKIN:03 PM I met with the patient, room #22, obtained history, performed an initial exam, and discussed options and plan for diagnostics and treatment here in the ED.  10:24 PM We discussed the plan for discharge and the patient is agreeable. Reviewed supportive cares, symptomatic treatment, outpatient follow up, and reasons to return to the Emergency Department. Patient to be discharged by ED RN.    The patient presented emergency department today with complaints of nausea, vomiting, and diarrhea. He was recently tested positive for Covid. He appeared well on exam. Laboratory testing is entirely unremarkable. He was given IV fluids along with nausea medication and had improvement in his symptoms. Plan will be for discharge home with nausea medication and Pepcid with instructions for close primary care follow-up. He is comfortable with this plan.    At the conclusion of the encounter I discussed the results of all of the tests and the disposition. The questions were answered. The patient or family acknowledged understanding and was agreeable with the care plan.         MEDICATIONS GIVEN IN THE EMERGENCY:  Medications   0.9% sodium chloride BOLUS (0 mLs Intravenous Stopped 21)   ondansetron (ZOFRAN) injection 4 mg (4 mg Intravenous Given 21)       NEW PRESCRIPTIONS STARTED AT TODAY'S ER VISIT  New Prescriptions    FAMOTIDINE (PEPCID) 20 MG TABLET    Take 1 tablet (20 mg) by mouth 2 times  daily for 10 days    ONDANSETRON (ZOFRAN ODT) 4 MG ODT TAB    Take 1 tablet (4 mg) by mouth every 8 hours as needed for nausea          =================================================================    HPI        Tristan Syed is a 46 year old male with no pertinent medical history who presents to this ED via walk in for nausea, vomiting, and diarrhea.  The patient states that he has had the symptoms for the past several days.  He also notes that he was tested positive for Covid approximately 6 days ago.  He has been able to keep down some water but has had persistent nausea and diarrhea.  He denies any difficulty in breathing or chest pain.  He has measured his temperature at home up to 99, but no higher.  No other current complaints.    REVIEW OF SYSTEMS     Constitutional:  Denies fever or chills  HENT:  Denies sore throat   Respiratory:  Denies cough or shortness of breath   Cardiovascular:  Denies chest pain or palpitations  GI: Positive for nausea, vomiting, and diarrhea.  Musculoskeletal:  Denies any new extremity pain   Skin:  Denies rash   Neurologic:  Denies headache, focal weakness or sensory changes    All other systems reviewed and are negative      PAST MEDICAL HISTORY:  Past Medical History:   Diagnosis Date     Anxiety      Depression        PAST SURGICAL HISTORY:  History reviewed. No pertinent surgical history.        CURRENT MEDICATIONS:    famotidine (PEPCID) 20 MG tablet  ondansetron (ZOFRAN ODT) 4 MG ODT tab  cetirizine (ZYRTEC) 10 MG tablet  citalopram (CELEXA) 20 MG tablet  EPINEPHrine (EPIPEN 2-PELON) 0.3 mg/0.3 mL injection  MEDICATION CANNOT BE REORDERED - PLEASE MANUALLY REORDER AND DISCONTINUE THE OLD ORDER        ALLERGIES:  Allergies   Allergen Reactions     Augmentin Nausea and Vomiting     Penicillins Nausea and Vomiting       FAMILY HISTORY:  History reviewed. No pertinent family history.    SOCIAL HISTORY:   Social History     Socioeconomic History     Marital status: Single     " Spouse name: None     Number of children: None     Years of education: None     Highest education level: None   Occupational History     None   Tobacco Use     Smoking status: Never Smoker     Smokeless tobacco: Never Used   Substance and Sexual Activity     Alcohol use: No     Drug use: None     Sexual activity: None   Other Topics Concern     None   Social History Narrative     None     Social Determinants of Health     Financial Resource Strain: Not on file   Food Insecurity: Not on file   Transportation Needs: Not on file   Physical Activity: Not on file   Stress: Not on file   Social Connections: Not on file   Intimate Partner Violence: Not on file   Housing Stability: Not on file       VITALS:  Patient Vitals for the past 24 hrs:   BP Temp Temp src Pulse Resp SpO2 Height Weight   11/20/21 2147 -- -- -- 80 -- 98 % -- --   11/20/21 2000 (!) 136/95 -- -- -- -- -- -- --   11/20/21 1942 (!) 140/84 97.9  F (36.6  C) Temporal 104 16 97 % 1.778 m (5' 10\") 106.6 kg (235 lb)       PHYSICAL EXAM    Constitutional:  Well developed, Well nourished,  HENT:  Normocephalic, Atraumatic, Bilateral external ears normal, Oropharynx moist, Nose normal.   Neck:  Normal range of motion, No meningismus, No stridor.   Eyes:  EOMI, Conjunctiva normal, No discharge.   Respiratory:  Normal breath sounds, No respiratory distress, No wheezing, No chest tenderness.   Cardiovascular:  Normal heart rate, Normal rhythm, No murmurs  GI:  Soft, No tenderness, No guarding   Musculoskeletal:   No tenderness to palpation or major deformities noted.   Integument:  Warm, Dry, No erythema, No rash.   Neurologic:  Alert & oriented x 3, No focal deficits noted.   Psychiatric:  Affect normal, Judgment normal, Mood normal.      LAB:  All pertinent labs reviewed and interpreted.  Results for orders placed or performed during the hospital encounter of 11/20/21   Comprehensive metabolic panel   Result Value Ref Range    Sodium 136 136 - 145 mmol/L    " Potassium 4.0 3.5 - 5.0 mmol/L    Chloride 105 98 - 107 mmol/L    Carbon Dioxide (CO2) 25 22 - 31 mmol/L    Anion Gap 6 5 - 18 mmol/L    Urea Nitrogen 11 8 - 22 mg/dL    Creatinine 0.94 0.70 - 1.30 mg/dL    Calcium 9.1 8.5 - 10.5 mg/dL    Glucose 107 70 - 125 mg/dL    Alkaline Phosphatase 94 45 - 120 U/L    AST 19 0 - 40 U/L    ALT 18 0 - 45 U/L    Protein Total 6.8 6.0 - 8.0 g/dL    Albumin 3.7 3.5 - 5.0 g/dL    Bilirubin Total 1.1 (H) 0.0 - 1.0 mg/dL    GFR Estimate >90 >60 mL/min/1.73m2   CBC with platelets and differential   Result Value Ref Range    WBC Count 3.6 (L) 4.0 - 11.0 10e3/uL    RBC Count 5.92 (H) 4.40 - 5.90 10e6/uL    Hemoglobin 16.6 13.3 - 17.7 g/dL    Hematocrit 48.0 40.0 - 53.0 %    MCV 81 78 - 100 fL    MCH 28.0 26.5 - 33.0 pg    MCHC 34.6 31.5 - 36.5 g/dL    RDW 12.3 10.0 - 15.0 %    Platelet Count 138 (L) 150 - 450 10e3/uL    % Neutrophils 79 %    % Lymphocytes 14 %    % Monocytes 7 %    % Eosinophils 0 %    % Basophils 0 %    % Immature Granulocytes 0 %    NRBCs per 100 WBC 0 <1 /100    Absolute Neutrophils 2.8 1.6 - 8.3 10e3/uL    Absolute Lymphocytes 0.5 (L) 0.8 - 5.3 10e3/uL    Absolute Monocytes 0.2 0.0 - 1.3 10e3/uL    Absolute Eosinophils 0.0 0.0 - 0.7 10e3/uL    Absolute Basophils 0.0 0.0 - 0.2 10e3/uL    Absolute Immature Granulocytes 0.0 <=0.0 10e3/uL    Absolute NRBCs 0.0 10e3/uL   UA with Microscopic reflex to Culture    Specimen: Urine, Midstream   Result Value Ref Range    Color Urine Light Yellow Colorless, Straw, Light Yellow, Yellow    Appearance Urine Clear Clear    Glucose Urine Negative Negative mg/dL    Bilirubin Urine Negative Negative    Ketones Urine 60  (A) Negative mg/dL    Specific Gravity Urine 1.018 1.001 - 1.030    Blood Urine 0.03 mg/dL (A) Negative    pH Urine 5.5 5.0 - 7.0    Protein Albumin Urine Negative Negative mg/dL    Urobilinogen Urine <2.0 <2.0 mg/dL    Nitrite Urine Negative Negative    Leukocyte Esterase Urine Negative Negative    Mucus Urine Present  (A) None Seen /LPF    RBC Urine <1 <=2 /HPF    WBC Urine 1 <=5 /HPF           I, Amelia Willams, am serving as a scribe to document services personally performed by Dr. Lerma based on my observation and the provider's statements to me. I, Irvin Lerma, DO attest that Amelia Willams is acting in a scribe capacity, has observed my performance of the services and has documented them in accordance with my direction.    Irvin Lerma, DO  Emergency Medicine  [facility] EMERGENCY DEPARTMENT  [address] [place] [id number] [phone]  Dept: [phone]       Irvin Lerma MD  11/20/21 6036

## 2021-11-21 NOTE — ED TRIAGE NOTES
Pt here with n/v/d that started Wednesday. Vomiting started yesterday, diarrhea Wednesday. Immodium taken yesterday and no diarrhea since. He tested positive for COVID on Monday, sx started 1 week ago.

## 2021-11-21 NOTE — DISCHARGE INSTRUCTIONS
Your symptoms today are likely related to your recent Covid diagnosis.  Take the prescribed medications as directed and follow-up closely with your primary care doctor as an outpatient.  Return to the ER if you have any worsening symptoms or other concerns.

## 2021-11-23 ENCOUNTER — E-VISIT (OUTPATIENT)
Dept: FAMILY MEDICINE | Facility: CLINIC | Age: 46
End: 2021-11-23
Payer: COMMERCIAL

## 2021-11-23 DIAGNOSIS — Z03.818 ENCOUNTER FOR PATIENT CONCERN ABOUT EXPOSURE TO INFECTIOUS ORGANISM: Primary | ICD-10-CM

## 2021-11-23 PROCEDURE — 99421 OL DIG E/M SVC 5-10 MIN: CPT | Performed by: PHYSICIAN ASSISTANT

## 2021-11-23 NOTE — PATIENT INSTRUCTIONS
Dear Tristan Syed,    Based on the details you've shared, you do not need to be tested at this time and may return to work once you are 10 days out from when symptoms began (so today). You may end quarantine at the end of the day as long as you have been fever free for the past 24 hours.     We do not recommend testing for people who previously tested positive to see if they are still contagious. This is because the test can remain positive for up to 90 days after the first Covid test. We consider you non-contagious 10 days after your covid symptoms started or 20 days if you are immunocompromised. So you may return to normal activities 10 days (or 20 if immunocompromised) after symptoms started and do not need to be re-tested.     Please submit a new visit or call your clinic if you think we missed needed information.    What are the symptoms of COVID-19?  The most common symptoms are cough, fever and trouble breathing. Less common symptoms include headache, body aches, fatigue (feeling very tired), chills, sore throat, stuffy or runny nose, diarrhea (loose poop), loss of taste or smell, belly pain, and nausea or vomiting (feeling sick to your stomach or throwing up).    It is common for loss of taste/smell, fatigue, cough, and headaches to continue past the contagious time period. Sometimes these symptoms persist for many weeks. If you feel that any of these symptoms are worsening please feel free to come into one of our urgent care facilities for examination.     Where can I get more information?  [facility] - About COVID-19: [email or web address]  CDC - What to Do If You're Sick: www.cdc.gov/coronavirus/2019-ncov/about/steps-when-sick.html  CDC - Ending Home Isolation: www.cdc.gov/coronavirus/2019-ncov/hcp/disposition-in-home-patients.html  CDC - Caring for Someone: www.cdc.gov/coronavirus/2019-ncov/if-you-are-sick/care-for-someone.html  [facility] - Interim Guidance for Hospital Discharge to  Home: www.health.[facility].mn./diseases/coronavirus/hcp/hospdischarge.pdf  [facility] clinical trials (COVID-19 research studies): clinicalaffairs.[facility]/umn-clinical-trials  Below are the COVID-19 hotlines at the [place] [facility] of [facility] ([facility]). Interpreters are available.  For health questions: Call 916-233-6024 or 1-628.393.8199 (7 a.m. to 7 p.m.)  For questions about schools and childcare: Call 197-235-9259 or 1-667.200.3037 (7 a.m. to 7 p.m.)

## 2022-01-11 ENCOUNTER — E-VISIT (OUTPATIENT)
Dept: URGENT CARE | Facility: URGENT CARE | Age: 47
End: 2022-01-11
Payer: COMMERCIAL

## 2022-01-11 ENCOUNTER — NURSE TRIAGE (OUTPATIENT)
Dept: NURSING | Facility: CLINIC | Age: 47
End: 2022-01-11

## 2022-01-11 DIAGNOSIS — B96.89 ACUTE BACTERIAL SINUSITIS: Primary | ICD-10-CM

## 2022-01-11 DIAGNOSIS — J01.90 ACUTE BACTERIAL SINUSITIS: Primary | ICD-10-CM

## 2022-01-11 DIAGNOSIS — J32.9 SINUSITIS, UNSPECIFIED CHRONICITY, UNSPECIFIED LOCATION: Primary | ICD-10-CM

## 2022-01-11 PROCEDURE — 99421 OL DIG E/M SVC 5-10 MIN: CPT | Performed by: FAMILY MEDICINE

## 2022-01-11 RX ORDER — DOXYCYCLINE HYCLATE 100 MG
100 TABLET ORAL 2 TIMES DAILY
Qty: 14 TABLET | Refills: 0 | Status: SHIPPED | OUTPATIENT
Start: 2022-01-11 | End: 2022-01-18

## 2022-01-11 NOTE — PATIENT INSTRUCTIONS
Dear Tristan Syed    After reviewing your responses, I've been able to diagnose you with?a sinus infection caused by bacteria.?     Based on your responses and diagnosis, I have prescribed Augmentin to treat your symptoms. I have sent this to your pharmacy.?     It is also important to stay well hydrated, get lots of rest and take over-the-counter decongestants,?tylenol?or ibuprofen if you?are able to?take those medications per your primary care provider to help relieve discomfort.?     It is important that you take?all of?your prescribed medication even if your symptoms are improving after a few doses.? Taking?all of?your medicine helps prevent the symptoms from returning.?     If your symptoms worsen, you develop severe headache, vomiting, high fever (>102), or are not improving in 7 days, please contact your primary care provider for an appointment or visit any of our convenient Walk-in Care or Urgent Care Centers to be seen which can be found on our website?here.?     Thanks again for choosing?us?as your health care partner,?   ?  Wade Medina, DO?

## 2022-01-11 NOTE — TELEPHONE ENCOUNTER
Patient tested negative for Covid but did an e-visit 1-11-22 and prescribed Augmentin for sinus infection, but patient is allergic and has had severe negative GI reactions, and would like a different prescription sent to the [facility]'s on file.    Patient can be reached at [phone].    Routed to E-visit provider vicki Vail RN  [facility]      Reason for Disposition    [1] Caller has URGENT medication question about med that PCP or specialist prescribed AND [2] triager unable to answer question    Protocols used: MEDICATION QUESTION CALL-A-

## 2022-01-18 ENCOUNTER — MYC REFILL (OUTPATIENT)
Dept: FAMILY MEDICINE | Facility: CLINIC | Age: 47
End: 2022-01-18
Payer: COMMERCIAL

## 2022-01-18 DIAGNOSIS — F32.A ANXIETY AND DEPRESSION: ICD-10-CM

## 2022-01-18 DIAGNOSIS — F41.9 ANXIETY AND DEPRESSION: ICD-10-CM

## 2022-01-20 ENCOUNTER — MYC REFILL (OUTPATIENT)
Dept: FAMILY MEDICINE | Facility: CLINIC | Age: 47
End: 2022-01-20
Payer: COMMERCIAL

## 2022-01-20 DIAGNOSIS — F32.A ANXIETY AND DEPRESSION: ICD-10-CM

## 2022-01-20 DIAGNOSIS — F41.9 ANXIETY AND DEPRESSION: ICD-10-CM

## 2022-01-21 RX ORDER — CITALOPRAM HYDROBROMIDE 20 MG/1
20 TABLET ORAL DAILY
Qty: 90 TABLET | Refills: 0 | Status: SHIPPED | OUTPATIENT
Start: 2022-01-21 | End: 2022-04-21

## 2022-01-21 NOTE — TELEPHONE ENCOUNTER
"Routing refill request to provider for review/approval because:  PHQ-9 score done 1 year ago. Pt does have upcoming appt on 01/31/2022 with PCP.    PHQ-9 and GAD7 Scores  1/21/2021    PHQ-9 Total Score 1    1/21/2021   Review Flowsheet       Last Written Prescription Date:  01/15/2021  Last Fill Quantity: 90,  # refills: 3  Last office visit provider:  01/15/2021 with Dr Vickers.    Requested Prescriptions   Pending Prescriptions Disp Refills     citalopram (CELEXA) 20 MG tablet 90 tablet 3     Sig: Take 1 tablet (20 mg) by mouth daily       SSRIs Protocol Failed - 1/18/2022 11:25 PM        Failed - PHQ-9 score less than 5 in past 6 months     Please review last PHQ-9 score.           Passed - Medication is active on med list        Passed - Patient is age 18 or older        Passed - Recent (6 mo) or future (30 days) visit within the authorizing provider's specialty     Patient had office visit in the last 6 months or has a visit in the next 30 days with authorizing provider or within the authorizing provider's specialty.  See \"Patient Info\" tab in inbasket, or \"Choose Columns\" in Meds & Orders section of the refill encounter.                 Veena Mckeon 01/21/22 7:32 AM  "

## 2022-01-23 RX ORDER — CITALOPRAM HYDROBROMIDE 20 MG/1
20 TABLET ORAL DAILY
Qty: 90 TABLET | Refills: 3 | OUTPATIENT
Start: 2022-01-23

## 2022-01-23 RX ORDER — CITALOPRAM HYDROBROMIDE 20 MG/1
TABLET ORAL
Qty: 90 TABLET | Refills: 3 | OUTPATIENT
Start: 2022-01-23

## 2022-01-23 NOTE — TELEPHONE ENCOUNTER
"Duplicate.     PHQ-9 and GAD7 Scores  1/21/2021    PHQ-9 Total Score 1    1/21/2021     Last Written Prescription Date:  01/21/2022  Last Fill Quantity: 90,  # refills: 0   Last office visit provider:  01/15/2021 with Dr Vickers.    Requested Prescriptions   Pending Prescriptions Disp Refills     citalopram (CELEXA) 20 MG tablet 90 tablet 3     Sig: Take 1 tablet (20 mg) by mouth daily       SSRIs Protocol Failed - 1/20/2022  8:31 PM        Failed - PHQ-9 score less than 5 in past 6 months     Please review last PHQ-9 score.           Passed - Medication is active on med list        Passed - Patient is age 18 or older        Passed - Recent (6 mo) or future (30 days) visit within the authorizing provider's specialty     Patient had office visit in the last 6 months or has a visit in the next 30 days with authorizing provider or within the authorizing provider's specialty.  See \"Patient Info\" tab in inbasket, or \"Choose Columns\" in Meds & Orders section of the refill encounter.                 Veena Mckeon 01/23/22 5:48 PM  "

## 2022-01-31 ENCOUNTER — OFFICE VISIT (OUTPATIENT)
Dept: FAMILY MEDICINE | Facility: CLINIC | Age: 47
End: 2022-01-31
Payer: COMMERCIAL

## 2022-01-31 VITALS
SYSTOLIC BLOOD PRESSURE: 130 MMHG | BODY MASS INDEX: 33.37 KG/M2 | HEART RATE: 61 BPM | DIASTOLIC BLOOD PRESSURE: 74 MMHG | OXYGEN SATURATION: 99 % | HEIGHT: 70 IN | WEIGHT: 233.1 LBS

## 2022-01-31 DIAGNOSIS — D72.819 LEUKOPENIA, UNSPECIFIED TYPE: ICD-10-CM

## 2022-01-31 DIAGNOSIS — Z12.11 COLON CANCER SCREENING: ICD-10-CM

## 2022-01-31 DIAGNOSIS — Z00.00 ROUTINE GENERAL MEDICAL EXAMINATION AT A HEALTH CARE FACILITY: Primary | ICD-10-CM

## 2022-01-31 DIAGNOSIS — D69.6 THROMBOCYTOPENIA (H): ICD-10-CM

## 2022-01-31 DIAGNOSIS — E80.6 HYPERBILIRUBINEMIA: ICD-10-CM

## 2022-01-31 LAB
ALBUMIN SERPL-MCNC: 3.9 G/DL (ref 3.5–5)
ALP SERPL-CCNC: 84 U/L (ref 45–120)
ALT SERPL W P-5'-P-CCNC: 30 U/L (ref 0–45)
ANION GAP SERPL CALCULATED.3IONS-SCNC: 9 MMOL/L (ref 5–18)
AST SERPL W P-5'-P-CCNC: 24 U/L (ref 0–40)
BILIRUB SERPL-MCNC: 0.8 MG/DL (ref 0–1)
BUN SERPL-MCNC: 12 MG/DL (ref 8–22)
CALCIUM SERPL-MCNC: 9.6 MG/DL (ref 8.5–10.5)
CHLORIDE BLD-SCNC: 104 MMOL/L (ref 98–107)
CHOLEST SERPL-MCNC: 175 MG/DL
CO2 SERPL-SCNC: 25 MMOL/L (ref 22–31)
CREAT SERPL-MCNC: 0.85 MG/DL (ref 0.7–1.3)
ERYTHROCYTE [DISTWIDTH] IN BLOOD BY AUTOMATED COUNT: 12.8 % (ref 10–15)
FASTING STATUS PATIENT QL REPORTED: YES
GFR SERPL CREATININE-BSD FRML MDRD: >90 ML/MIN/1.73M2
GLUCOSE BLD-MCNC: 86 MG/DL (ref 70–125)
HCT VFR BLD AUTO: 46.4 % (ref 40–53)
HDLC SERPL-MCNC: 60 MG/DL
HGB BLD-MCNC: 16.1 G/DL (ref 13.3–17.7)
LDLC SERPL CALC-MCNC: 99 MG/DL
MCH RBC QN AUTO: 28.1 PG (ref 26.5–33)
MCHC RBC AUTO-ENTMCNC: 34.7 G/DL (ref 31.5–36.5)
MCV RBC AUTO: 81 FL (ref 78–100)
PLATELET # BLD AUTO: 267 10E3/UL (ref 150–450)
POTASSIUM BLD-SCNC: 4.7 MMOL/L (ref 3.5–5)
PROT SERPL-MCNC: 6.4 G/DL (ref 6–8)
RBC # BLD AUTO: 5.72 10E6/UL (ref 4.4–5.9)
SODIUM SERPL-SCNC: 138 MMOL/L (ref 136–145)
TRIGL SERPL-MCNC: 81 MG/DL
WBC # BLD AUTO: 4.4 10E3/UL (ref 4–11)

## 2022-01-31 PROCEDURE — 85027 COMPLETE CBC AUTOMATED: CPT | Performed by: FAMILY MEDICINE

## 2022-01-31 PROCEDURE — 80061 LIPID PANEL: CPT | Performed by: FAMILY MEDICINE

## 2022-01-31 PROCEDURE — 99396 PREV VISIT EST AGE 40-64: CPT | Performed by: FAMILY MEDICINE

## 2022-01-31 PROCEDURE — 36415 COLL VENOUS BLD VENIPUNCTURE: CPT | Performed by: FAMILY MEDICINE

## 2022-01-31 PROCEDURE — 80053 COMPREHEN METABOLIC PANEL: CPT | Performed by: FAMILY MEDICINE

## 2022-01-31 ASSESSMENT — MIFFLIN-ST. JEOR: SCORE: 1938.58

## 2022-01-31 ASSESSMENT — ANXIETY QUESTIONNAIRES
3. WORRYING TOO MUCH ABOUT DIFFERENT THINGS: NOT AT ALL
4. TROUBLE RELAXING: NOT AT ALL
6. BECOMING EASILY ANNOYED OR IRRITABLE: SEVERAL DAYS
GAD7 TOTAL SCORE: 1
7. FEELING AFRAID AS IF SOMETHING AWFUL MIGHT HAPPEN: NOT AT ALL
7. FEELING AFRAID AS IF SOMETHING AWFUL MIGHT HAPPEN: NOT AT ALL
GAD7 TOTAL SCORE: 1
2. NOT BEING ABLE TO STOP OR CONTROL WORRYING: NOT AT ALL
GAD7 TOTAL SCORE: 1
1. FEELING NERVOUS, ANXIOUS, OR ON EDGE: NOT AT ALL
5. BEING SO RESTLESS THAT IT IS HARD TO SIT STILL: NOT AT ALL

## 2022-01-31 ASSESSMENT — PATIENT HEALTH QUESTIONNAIRE - PHQ9
SUM OF ALL RESPONSES TO PHQ QUESTIONS 1-9: 2
10. IF YOU CHECKED OFF ANY PROBLEMS, HOW DIFFICULT HAVE THESE PROBLEMS MADE IT FOR YOU TO DO YOUR WORK, TAKE CARE OF THINGS AT HOME, OR GET ALONG WITH OTHER PEOPLE: NOT DIFFICULT AT ALL
SUM OF ALL RESPONSES TO PHQ QUESTIONS 1-9: 2

## 2022-01-31 NOTE — PROGRESS NOTES
"Tristan Syed  /[birth year]   Pulse 61   Ht 1.778 m (5' 10\")   Wt 105.7 kg (233 lb 1.6 oz)   SpO2 99%   BMI 33.45 kg/m       Assessment/Plan:                Tristan was seen today for physical.    Diagnoses and all orders for this visit:    Routine general medical examination at a health care facility  -     Lipid panel reflex to direct LDL Fasting    Hyperbilirubinemia  -     Comprehensive metabolic panel (BMP + Alb, Alk Phos, ALT, AST, Total. Bili, TP)    Leukopenia, unspecified type  -     CBC with platelets    Thrombocytopenia (H)  -     CBC with platelets    Colon cancer screening  -     Adult Gastro Ref - Procedure Only; Future         DISCUSSION  See discussion below.  Referral for colon cancer screening, check labs as above.    If issue with the left foot becomes more of a concern will consider referral to podiatry.  Subjective:     HPI:    Tristan Syed is a 46 year old male with history of underlying anxiety is here today for physical.  He contracted Covid back in November which required an emergency department visit.  Reports prominent gastrointestinal symptoms.  Had laboratory testing showed mild elevation of bilirubin, mild leukopenia and mild thrombocytopenia.  Discussed all lab test results are likely a product of the illness itself but we will recheck these labs.  He also suffered from a sinus infection in January and has since recovered.  Many people from his workplace suffered with Covid around the time that he did.  He also reports that he is aware of 2 people who have  which affected him more significantly earlier this winter.  He feels from a mental health standpoint he is managing well and would like to continue with the citalopram.    States he sustained a work-related injury to his left foot this year.  He had a tear in the plantar fascia.  He now has a lump near the region.  The lump is present that is a bit sensitive but not particularly bothersome.  He does not wish to take " further action at this time.    We reviewed and discussed routine health preventive measures including blood pressure, body mass index, colon cancer screening and other.  Elected to refer for colonoscopy.  Discussed specifically COVID vaccinations he will contemplate this further, he has not vaccine adverse but is hesitant to receive it today.  Declines influenza vaccine.        ROS:  Complete review of systems is obtained.  Other than the specific considerations noted above complete review of systems is negative.    Objective:   Medications:  Current Outpatient Medications   Medication     cetirizine (ZYRTEC) 10 MG tablet     citalopram (CELEXA) 20 MG tablet     Saw Palmetto, Serenoa repens, (MENS FORMULA OR)     No current facility-administered medications for this visit.        Allergies:  Allergies   Allergen Reactions     Augmentin Nausea and Vomiting     Penicillins Nausea and Vomiting        Social History     Socioeconomic History     Marital status: Single     Spouse name: Not on file     Number of children: Not on file     Years of education: Not on file     Highest education level: Not on file   Occupational History     Not on file   Tobacco Use     Smoking status: Never Smoker     Smokeless tobacco: Never Used   Substance and Sexual Activity     Alcohol use: No     Drug use: Not on file     Sexual activity: Not on file   Other Topics Concern     Not on file   Social History Narrative     Not on file     Social Determinants of Health     Financial Resource Strain: Not on file   Food Insecurity: Not on file   Transportation Needs: Not on file   Physical Activity: Not on file   Stress: Not on file   Social Connections: Not on file   Intimate Partner Violence: Not on file   Housing Stability: Not on file       No family history on file.     Most Recent Immunizations   Administered Date(s) Administered     DT (PEDS <7y) 01/01/2004     Td (Adult), Adsorbed 01/01/2004     Td,adult,historic,unspecified  "01/01/2004     Tdap (Adacel,Boostrix) 08/01/2018        Wt Readings from Last 3 Encounters:   01/31/22 105.7 kg (233 lb 1.6 oz)   11/20/21 106.6 kg (235 lb)   01/15/21 104.3 kg (230 lb)        BP Readings from Last 6 Encounters:   01/31/22 130/74   11/20/21 (!) 136/95   01/15/21 112/68        PHYSICAL EXAM:Answers for HPI/ROS submitted by the patient on 1/31/2022  If you checked off any problems, how difficult have these problems made it for you to do your work, take care of things at home, or get along with other people?: Not difficult at all  PHQ9 TOTAL SCORE: 2  CRICKET 7 TOTAL SCORE: 1  Getting at least 3 servings of Calcium per day:: NO  Diet:: Regular (no restrictions)  Taking medications regularly:: Yes  Medication side effects:: Not applicable  Bi-annual eye exam:: Yes  Dental care twice a year:: Yes  Sleep apnea or symptoms of sleep apnea:: None  Additional concerns today:: No  Duration of exercise:: Less than 15 minutes      /73   Pulse 61   Ht 1.778 m (5' 10\")   Wt 105.7 kg (233 lb 1.6 oz)   SpO2 99%   BMI 33.45 kg/m           General Appearance:    Alert, cooperative, no distress   Eyes:   No scleral icterus or conjunctival irritation       Ears:    Normal TM's and external ear canals, both ears   Throat:   Lips, mucosa, and tongue normal; teeth and gums normal   Neck:   Supple, symmetrical, trachea midline, no adenopathy;        thyroid:  No enlargement/tenderness/nodules   Lungs:     Clear to auscultation bilaterally, respirations unlabored, no wheezes or crackles   Heart:    Regular rate and rhythm,  No murmur   Abdomen:    Soft, no distention, no tenderness on palpation, no masses, no organomegaly     Extremities:  No edema, no joint swelling or redness, no evidence of any injuries.  Palpation of the left foot arch reveals a small soft tissue lump that is smooth and nontender measuring about 1 cm in its greatest diameter likely a plantar fibroma or scar tissue from his previously described " injury.   Skin:  No concerning skin findings, no suspicious moles, no rashes   Neurologic:  On gross examination there is no motor or sensory deficit.  Patient walks with a normal gait

## 2022-02-01 ASSESSMENT — PATIENT HEALTH QUESTIONNAIRE - PHQ9: SUM OF ALL RESPONSES TO PHQ QUESTIONS 1-9: 2

## 2022-02-01 ASSESSMENT — ANXIETY QUESTIONNAIRES: GAD7 TOTAL SCORE: 1

## 2022-04-19 DIAGNOSIS — F32.A ANXIETY AND DEPRESSION: ICD-10-CM

## 2022-04-19 DIAGNOSIS — F41.9 ANXIETY AND DEPRESSION: ICD-10-CM

## 2022-04-21 RX ORDER — CITALOPRAM HYDROBROMIDE 20 MG/1
TABLET ORAL
Qty: 90 TABLET | Refills: 1 | Status: SHIPPED | OUTPATIENT
Start: 2022-04-21 | End: 2022-10-17

## 2022-04-21 NOTE — TELEPHONE ENCOUNTER
"Last Written Prescription Date:  1/21/22  Last Fill Quantity: 90,  # refills: 0   Last office visit provider:  1/31/22     Requested Prescriptions   Pending Prescriptions Disp Refills     citalopram (CELEXA) 20 MG tablet [Pharmacy Med Name: CITALOPRAM 20MG TABLETS] 90 tablet 0     Sig: TAKE 1 TABLET(20 MG) BY MOUTH DAILY       SSRIs Protocol Passed - 4/21/2022  9:04 AM        Passed - PHQ-9 score less than 5 in past 6 months     Please review last PHQ-9 score.           Passed - Medication is active on med list        Passed - Patient is age 18 or older        Passed - Recent (6 mo) or future (30 days) visit within the authorizing provider's specialty     Patient had office visit in the last 6 months or has a visit in the next 30 days with authorizing provider or within the authorizing provider's specialty.  See \"Patient Info\" tab in inbasket, or \"Choose Columns\" in Meds & Orders section of the refill encounter.                 Wade Nolen RN 04/21/22 9:04 AM  "

## 2022-10-17 DIAGNOSIS — F32.A ANXIETY AND DEPRESSION: ICD-10-CM

## 2022-10-17 DIAGNOSIS — F41.9 ANXIETY AND DEPRESSION: ICD-10-CM

## 2022-10-17 RX ORDER — CITALOPRAM HYDROBROMIDE 20 MG/1
TABLET ORAL
Qty: 90 TABLET | Refills: 1 | Status: SHIPPED | OUTPATIENT
Start: 2022-10-17 | End: 2023-04-16

## 2022-10-17 NOTE — TELEPHONE ENCOUNTER
"Routing refill request to provider for review/approval because:  phq 9    Last Written Prescription Date:  4/21/22  Last Fill Quantity: 90,  # refills: 1   Last office visit provider:  1/13/22     Requested Prescriptions   Pending Prescriptions Disp Refills     citalopram (CELEXA) 20 MG tablet [Pharmacy Med Name: CITALOPRAM 20MG TABLETS] 90 tablet 1     Sig: TAKE 1 TABLET(20 MG) BY MOUTH DAILY       SSRIs Protocol Failed - 10/17/2022  3:15 AM        Failed - PHQ-9 score less than 5 in past 6 months     Please review last PHQ-9 score.           Failed - Recent (6 mo) or future (30 days) visit within the authorizing provider's specialty     Patient had office visit in the last 6 months or has a visit in the next 30 days with authorizing provider or within the authorizing provider's specialty.  See \"Patient Info\" tab in inbasket, or \"Choose Columns\" in Meds & Orders section of the refill encounter.            Passed - Medication is active on med list        Passed - Patient is age 18 or older             Lucía Lindsay RN 10/17/22 2:04 PM  "

## 2022-11-19 ENCOUNTER — HEALTH MAINTENANCE LETTER (OUTPATIENT)
Age: 47
End: 2022-11-19

## 2023-04-09 ENCOUNTER — HEALTH MAINTENANCE LETTER (OUTPATIENT)
Age: 48
End: 2023-04-09

## 2023-05-02 ENCOUNTER — OFFICE VISIT (OUTPATIENT)
Dept: FAMILY MEDICINE | Facility: CLINIC | Age: 48
End: 2023-05-02
Payer: COMMERCIAL

## 2023-05-02 VITALS
HEIGHT: 70 IN | OXYGEN SATURATION: 98 % | SYSTOLIC BLOOD PRESSURE: 122 MMHG | RESPIRATION RATE: 18 BRPM | BODY MASS INDEX: 33.64 KG/M2 | WEIGHT: 235 LBS | TEMPERATURE: 98.4 F | HEART RATE: 62 BPM | DIASTOLIC BLOOD PRESSURE: 74 MMHG

## 2023-05-02 DIAGNOSIS — Z12.11 SCREEN FOR COLON CANCER: ICD-10-CM

## 2023-05-02 DIAGNOSIS — Z00.00 ROUTINE GENERAL MEDICAL EXAMINATION AT A HEALTH CARE FACILITY: Primary | ICD-10-CM

## 2023-05-02 DIAGNOSIS — Z51.81 MEDICATION MONITORING ENCOUNTER: ICD-10-CM

## 2023-05-02 DIAGNOSIS — Z11.59 ENCOUNTER FOR HEPATITIS C SCREENING TEST FOR LOW RISK PATIENT: ICD-10-CM

## 2023-05-02 DIAGNOSIS — Z11.4 SCREENING FOR HIV WITHOUT PRESENCE OF RISK FACTORS: ICD-10-CM

## 2023-05-02 LAB
CANNABINOIDS UR QL SCN: NORMAL
CHOLEST SERPL-MCNC: 161 MG/DL
FASTING STATUS PATIENT QL REPORTED: YES
GLUCOSE SERPL-MCNC: 81 MG/DL (ref 70–99)
HDLC SERPL-MCNC: 55 MG/DL
LDLC SERPL CALC-MCNC: 88 MG/DL
NONHDLC SERPL-MCNC: 106 MG/DL
TRIGL SERPL-MCNC: 90 MG/DL

## 2023-05-02 PROCEDURE — 36415 COLL VENOUS BLD VENIPUNCTURE: CPT | Performed by: FAMILY MEDICINE

## 2023-05-02 PROCEDURE — 80061 LIPID PANEL: CPT | Performed by: FAMILY MEDICINE

## 2023-05-02 PROCEDURE — 87389 HIV-1 AG W/HIV-1&-2 AB AG IA: CPT | Performed by: FAMILY MEDICINE

## 2023-05-02 PROCEDURE — 82947 ASSAY GLUCOSE BLOOD QUANT: CPT | Performed by: FAMILY MEDICINE

## 2023-05-02 PROCEDURE — 80307 DRUG TEST PRSMV CHEM ANLYZR: CPT | Performed by: FAMILY MEDICINE

## 2023-05-02 PROCEDURE — 99396 PREV VISIT EST AGE 40-64: CPT | Performed by: FAMILY MEDICINE

## 2023-05-02 PROCEDURE — 86803 HEPATITIS C AB TEST: CPT | Performed by: FAMILY MEDICINE

## 2023-05-02 RX ORDER — CETIRIZINE HYDROCHLORIDE 10 MG/1
10 TABLET ORAL DAILY
COMMUNITY

## 2023-05-02 ASSESSMENT — ENCOUNTER SYMPTOMS
NAUSEA: 0
DIARRHEA: 0
EYE PAIN: 0
FEVER: 0
WEAKNESS: 0
ARTHRALGIAS: 0
DIZZINESS: 0
DYSURIA: 0
SORE THROAT: 0
HEADACHES: 0
JOINT SWELLING: 0
NERVOUS/ANXIOUS: 0
ABDOMINAL PAIN: 0
FREQUENCY: 0
COUGH: 0
CONSTIPATION: 0
PARESTHESIAS: 1
SHORTNESS OF BREATH: 0
HEMATOCHEZIA: 0
HEMATURIA: 0
MYALGIAS: 1
CHILLS: 0
HEARTBURN: 0

## 2023-05-02 ASSESSMENT — PATIENT HEALTH QUESTIONNAIRE - PHQ9
SUM OF ALL RESPONSES TO PHQ QUESTIONS 1-9: 1
SUM OF ALL RESPONSES TO PHQ QUESTIONS 1-9: 1
10. IF YOU CHECKED OFF ANY PROBLEMS, HOW DIFFICULT HAVE THESE PROBLEMS MADE IT FOR YOU TO DO YOUR WORK, TAKE CARE OF THINGS AT HOME, OR GET ALONG WITH OTHER PEOPLE: NOT DIFFICULT AT ALL

## 2023-05-02 ASSESSMENT — PAIN SCALES - GENERAL: PAINLEVEL: NO PAIN (0)

## 2023-05-02 NOTE — PROGRESS NOTES
"Tristan Syed  /74   Pulse 62   Temp 98.4  F (36.9  C)   Resp 18   Ht 1.778 m (5' 10\")   Wt 106.6 kg (235 lb)   SpO2 98%   BMI 33.72 kg/m       Assessment/Plan:                Tristan was seen today for physical, screening and recheck medication.    Diagnoses and all orders for this visit:    Routine general medical examination at a health care facility  -     Lipid panel reflex to direct LDL Fasting  -     Glucose    Screen for colon cancer  -     Colonoscopy Screening  Referral; Future    Screening for HIV without presence of risk factors  -     HIV Antigen Antibody Combo    Encounter for hepatitis C screening test for low risk patient  -     Hepatitis C Screen Reflex to HCV RNA Quant and Genotype    Medication monitoring encounter  -     Cannabinoids qualitative urine         DISCUSSION  See discussion below.  Obtain labs as above.  Subjective:     HPI:    Tristan Syed is a 47 year old male he is generally healthy.  He has anxiety and uses citalopram regularly feels this is beneficial without significant side effects.  Patient also takes a CBD oil extract that he reports is helpful in alleviating muscle aches and pains, joint aches and pains as well and is helping him get to sleep.  He is somewhat concerned that this potentially could have unintentional THC present and he would like to have testing to determine.  We discussed arranging for urine testing today.  He was unable to schedule colonoscopy.  Patient had a family member who had a significant complication associate with a colonoscopy which made him rethink.  Patient does wish to proceed at this point in time.  We discussed obtaining routine laboratory screening tests as noted.  Generally has had low overall vascular disease risks including ideal blood pressure and cholesterol readings.  Weight is a bit higher than ideal.  We discussed exercise and nutrition today.  No other significant concerns.  He does still deal with " tinnitus but is protecting his hearing at this point.    ROS:  Complete review of systems is obtained.  Other than the specific considerations noted above complete review of systems is negative.          Objective:   Medications:  Current Outpatient Medications   Medication     cetirizine (ZYRTEC) 10 MG tablet     citalopram (CELEXA) 20 MG tablet     HEMP OIL OR EXTRACT OR OTHER CBD CANNABINOID, NOT MEDICAL CANNABIS,     No current facility-administered medications for this visit.        Allergies:     Allergies   Allergen Reactions     Amoxicillin-Pot Clavulanate Nausea and Vomiting     Penicillins Nausea and Vomiting        Social History     Socioeconomic History     Marital status: Single     Spouse name: Not on file     Number of children: Not on file     Years of education: Not on file     Highest education level: Not on file   Occupational History     Not on file   Tobacco Use     Smoking status: Never     Smokeless tobacco: Never   Vaping Use     Vaping status: Never Used   Substance and Sexual Activity     Alcohol use: No     Drug use: Not on file     Sexual activity: Not on file   Other Topics Concern     Not on file   Social History Narrative     Not on file     Social Determinants of Health     Financial Resource Strain: Not on file   Food Insecurity: Not on file   Transportation Needs: Not on file   Physical Activity: Not on file   Stress: Not on file   Social Connections: Not on file   Intimate Partner Violence: Not on file   Housing Stability: Not on file       No family history on file.     Most Recent Immunizations   Administered Date(s) Administered     DT (PEDS <7y) 01/01/2004     TDAP (Adacel,Boostrix) 08/01/2018     Td (Adult), Adsorbed 01/01/2004     Td,adult,historic,unspecified 01/01/2004        Wt Readings from Last 3 Encounters:   05/02/23 106.6 kg (235 lb)   01/31/22 105.7 kg (233 lb 1.6 oz)   11/20/21 106.6 kg (235 lb)        BP Readings from Last 6 Encounters:   05/02/23 122/74  "  01/31/22 130/74   11/20/21 (!) 136/95   01/15/21 112/68          PHYSICAL EXAM:    /74   Pulse 62   Temp 98.4  F (36.9  C)   Resp 18   Ht 1.778 m (5' 10\")   Wt 106.6 kg (235 lb)   SpO2 98%   BMI 33.72 kg/m           General Appearance:    Alert, cooperative, no distress   Eyes:   No scleral icterus or conjunctival irritation       Ears:    Normal TM's and external ear canals, both ears   Throat:   Lips, mucosa, and tongue normal; teeth and gums normal   Neck:   Supple, symmetrical, trachea midline, no adenopathy;        thyroid:  No enlargement/tenderness/nodules   Lungs:     Clear to auscultation bilaterally, respirations unlabored, no wheezes or crackles   Heart:    Regular rate and rhythm,  No murmur   Abdomen:    Soft, no distention, no tenderness on palpation, no masses, no organomegaly     Extremities:  No edema, no joint swelling or redness, no evidence of any injuries   Skin:  No concerning skin findings, no suspicious moles, no rashes   Neurologic:  On gross examination there is no motor or sensory deficit.  Patient walks with a normal gait           Answers for HPI/ROS submitted by the patient on 5/2/2023  If you checked off any problems, how difficult have these problems made it for you to do your work, take care of things at home, or get along with other people?: Not difficult at all  PHQ9 TOTAL SCORE: 1  Frequency of exercise:: 2-3 days/week  Getting at least 3 servings of Calcium per day:: NO  Diet:: Regular (no restrictions)  Taking medications regularly:: Yes  Medication side effects:: None  Bi-annual eye exam:: Yes  Dental care twice a year:: Yes  Sleep apnea or symptoms of sleep apnea:: None  abdominal pain: No  Blood in stool: No  Blood in urine: No  chest pain: No  chills: No  congestion: No  constipation: No  cough: No  diarrhea: No  dizziness: No  ear pain: No  eye pain: No  nervous/anxious: No  fever: No  frequency: No  genital sores: No  headaches: No  hearing loss: " No  heartburn: No  arthralgias: No  joint swelling: No  peripheral edema: No  mood changes: Yes  myalgias: Yes  nausea: No  dysuria: No  Skin sensation changes: Yes  sore throat: No  urgency: No  rash: No  shortness of breath: No  visual disturbance: No  weakness: No  impotence: No  penile discharge: No  Additional concerns today:: Yes  Duration of exercise:: Less than 15 minutes

## 2023-05-03 LAB
HCV AB SERPL QL IA: NONREACTIVE
HIV 1+2 AB+HIV1 P24 AG SERPL QL IA: NONREACTIVE

## 2023-06-30 ENCOUNTER — NURSE TRIAGE (OUTPATIENT)
Dept: FAMILY MEDICINE | Facility: CLINIC | Age: 48
End: 2023-06-30
Payer: COMMERCIAL

## 2023-06-30 NOTE — TELEPHONE ENCOUNTER
Nurse Triage SBAR    Is this a 2nd Level Triage? NO    Situation:   Incoming call from patient on 6/30/23 to report swelling of his left lower eyelid    Background:  Hx of allergic rhinitis. Patient reports increased swelling of his lower left eyelid since Monday, 6/26/23.     Assessment:   Patient reports he was weed whipping on Monday, 6/26/23, and later that night, the patient noticed his lower left eyelid was slightly swollen.    Reports the swelling of his lower left lid has increased since Monday, 6/26/23, and it is now obstructing his view when he looks down    Denies rash, excessive discharge, double vision, blurry vision, eye completely shut or unability to open    Possibly something got into his eye when he was doing yard work on Monday, 6/26/23, although no known injury to left eye    Itchiness has been rated as 9-10 out of ten some of the time. Pain is rated as 2-3 out of ten.    Afebrile.     Denies any irritation to his right eye. Patient is able to see clearly from his left eye, with exception of obstruction when he looks down with is left eye    Denies other allergy symptoms at this time, such as runny nose, congestion, sinus concerns.    Left eye has a slight amount of crusty discharge in the morning in the corner of his eye, but no pus or green or yellow colored drainage from his left eye    Denies other problems, such as trouble breathing, chest pain, SOB, abdominal pain, open areas anywhere else.    Protocol Recommended Disposition:   See in Office Today, See More Appropriate Protocol    Recommendation:   Gave care advice. Recommended the patient go into urgent care directly as there are no more available in clinic appointments today. Patient verbalized understanding and agrees with plan. Patient stated he will go directly to the [facility] for evaluation.     Go directly to [facility] right now    Does the patient meet one of the following criteria for ADS visit consideration?  "16+ years old, with an [facility] PCP     TIP  Providers, please consider if this condition is appropriate for management at one of our Acute and Diagnostic Services sites.     If patient is a good candidate, please use dotphrase <dot>triageresponse and select Refer to ADS to document.    Reason for Disposition    Contact with pollen, other allergic substance or eyedrops    Eyelids are very swollen (shut or almost)    Additional Information    Negative: Unresponsive, passed out or very weak    Negative: Difficulty breathing or wheezing    Negative: Difficulty swallowing or slurred speech and sudden onset    Negative: Sounds like a life-threatening emergency to the triager    Negative: Chemical got in the eye    Negative: Recent injury to the eye    Negative: Entire face is swollen    Negative: Sacs of clear fluid (blisters) on whites of eyes (allergic cysts)    Negative: Chemical gets into the eye from fingers, contaminated object, spray, or splash    Negative: Eye pain    Negative: Runny nose, nose itching, and sneezing also present    Negative: Reaction to antibiotic eye drops    Negative: Blurred vision and new or worsening    Negative: Sacs of clear fluid (blisters) on whites of eyes or inner lids    Answer Assessment - Initial Assessment Questions  1. ONSET: \"When did the swelling start?\" (e.g., minutes, hours, days)        Swelling started Monday, 6/26/23    2. LOCATION: \"What part of the eyelids is swollen?\"    Just lower left eyelid is affected    3. SEVERITY: \"How swollen is it?\"    When he looks down it obstructs his view    4. ITCHING: \"Is there any itching?\" If Yes, ask: \"How much?\"   (Scale 1-10; mild, moderate or severe)    Rated as about 9-10 out ten    5. PAIN: \"Is the swelling painful to touch?\" If Yes, ask: \"How painful is it?\"   (Scale 1-10; mild, moderate or severe)    Rates pain as 2-3 out of ten    6. FEVER: \"Do you have a fever?\" If Yes, ask: \"What is it, how was it measured, and when did it " "start?\"     Afebrile    7. CAUSE: \"What do you think is causing the swelling?\"    Did some weed whipping on Monday, 6/26/32    8. RECURRENT SYMPTOM: \"Have you had eyelid swelling before?\" If Yes, ask: \"When was the last time?\" \"What happened that time?\"    Years ago    9. OTHER SYMPTOMS: \"Do you have any other symptoms?\" (e.g., blurred vision, eye discharge, rash, runny nose)    Crusty discharge in corner of his left eye when he gats up    Protocols used: EYE - SWELLING-A-OH, EYE - ALLERGY-A-OH    Shelbie Huynh RN, BSN  [facility]    "

## 2024-04-13 DIAGNOSIS — F32.A ANXIETY AND DEPRESSION: ICD-10-CM

## 2024-04-13 DIAGNOSIS — F41.9 ANXIETY AND DEPRESSION: ICD-10-CM

## 2024-04-15 ENCOUNTER — MYC REFILL (OUTPATIENT)
Dept: FAMILY MEDICINE | Facility: CLINIC | Age: 49
End: 2024-04-15
Payer: COMMERCIAL

## 2024-04-15 DIAGNOSIS — F32.A ANXIETY AND DEPRESSION: ICD-10-CM

## 2024-04-15 DIAGNOSIS — F41.9 ANXIETY AND DEPRESSION: ICD-10-CM

## 2024-04-15 RX ORDER — CITALOPRAM HYDROBROMIDE 20 MG/1
TABLET ORAL
Qty: 90 TABLET | Refills: 3 | Status: SHIPPED | OUTPATIENT
Start: 2024-04-15

## 2024-04-16 RX ORDER — CITALOPRAM HYDROBROMIDE 20 MG/1
20 TABLET ORAL DAILY
Qty: 90 TABLET | Refills: 3 | OUTPATIENT
Start: 2024-04-16

## 2024-04-16 NOTE — TELEPHONE ENCOUNTER
This refill request is a duplicate request, previously received or sent.  Sent denial notification to pharmacy.  Carolin Noble RN  [facility]

## 2024-05-03 ENCOUNTER — OFFICE VISIT (OUTPATIENT)
Dept: FAMILY MEDICINE | Facility: CLINIC | Age: 49
End: 2024-05-03
Payer: COMMERCIAL

## 2024-05-03 VITALS
DIASTOLIC BLOOD PRESSURE: 74 MMHG | OXYGEN SATURATION: 98 % | RESPIRATION RATE: 18 BRPM | WEIGHT: 231 LBS | TEMPERATURE: 98 F | HEIGHT: 70 IN | SYSTOLIC BLOOD PRESSURE: 122 MMHG | BODY MASS INDEX: 33.07 KG/M2 | HEART RATE: 63 BPM

## 2024-05-03 DIAGNOSIS — F32.A ANXIETY AND DEPRESSION: ICD-10-CM

## 2024-05-03 DIAGNOSIS — Z12.11 SCREEN FOR COLON CANCER: ICD-10-CM

## 2024-05-03 DIAGNOSIS — F41.9 ANXIETY AND DEPRESSION: ICD-10-CM

## 2024-05-03 DIAGNOSIS — Z00.00 ROUTINE GENERAL MEDICAL EXAMINATION AT A HEALTH CARE FACILITY: Primary | ICD-10-CM

## 2024-05-03 PROCEDURE — 82947 ASSAY GLUCOSE BLOOD QUANT: CPT | Performed by: FAMILY MEDICINE

## 2024-05-03 PROCEDURE — 80061 LIPID PANEL: CPT | Performed by: FAMILY MEDICINE

## 2024-05-03 PROCEDURE — 99396 PREV VISIT EST AGE 40-64: CPT | Performed by: FAMILY MEDICINE

## 2024-05-03 PROCEDURE — 36415 COLL VENOUS BLD VENIPUNCTURE: CPT | Performed by: FAMILY MEDICINE

## 2024-05-03 SDOH — HEALTH STABILITY: PHYSICAL HEALTH: ON AVERAGE, HOW MANY MINUTES DO YOU ENGAGE IN EXERCISE AT THIS LEVEL?: 60 MIN

## 2024-05-03 SDOH — HEALTH STABILITY: PHYSICAL HEALTH: ON AVERAGE, HOW MANY DAYS PER WEEK DO YOU ENGAGE IN MODERATE TO STRENUOUS EXERCISE (LIKE A BRISK WALK)?: 5 DAYS

## 2024-05-03 ASSESSMENT — PAIN SCALES - GENERAL: PAINLEVEL: NO PAIN (0)

## 2024-05-03 ASSESSMENT — SOCIAL DETERMINANTS OF HEALTH (SDOH): HOW OFTEN DO YOU GET TOGETHER WITH FRIENDS OR RELATIVES?: ONCE A WEEK

## 2024-05-03 NOTE — PROGRESS NOTES
"Preventive Care Visit  [facility]  Sundar Vickers MD, MD, Family Medicine  May 3, 2024      Assessment & Plan     Jaylan was seen today for recheck medication and physical.    Diagnoses and all orders for this visit:    Routine general medical examination at a health care facility  -     Lipid panel reflex to direct LDL Fasting; Future  -     Glucose; Future    Screen for colon cancer  -     Colonoscopy Screening  Referral; Future    Anxiety and depression                     BMI  Estimated body mass index is 33.15 kg/m  as calculated from the following:    Height as of this encounter: 1.778 m (5' 10\").    Weight as of this encounter: 104.8 kg (231 lb).       Counseling  Appropriate preventive services were discussed with this patient, including applicable screening as appropriate for fall prevention, nutrition, physical activity, Tobacco-use cessation, weight loss and cognition.  Checklist reviewing preventive services available has been given to the patient.  Reviewed patient's diet, addressing concerns and/or questions.   He is at risk for psychosocial distress and has been provided with information to reduce risk.         Subjective   Jaylan is a 48 year old, presenting for the following:  Recheck Medication and Physical    He is here today for physical.  Blood pressure is well-controlled.  Weight is down slightly reviewed considerations related to nutrition and exercise today.  Discussed appropriate immunizations.  Discussed again colon cancer screening and reviewed the colonoscopy process will make a referral for him to try and get a colonoscopy done soon.  He does have underlying anxiety takes citalopram.  Reports once every few months will have a couple of bad days dealing with anxiety but overall feels his symptoms are well-controlled.  We discussed continuation of citalopram at its current dose.        5/3/2024    10:10 AM   Additional Questions   Roomed by [facility] " Care Directive  Patient does not have a Health Care Directive or Living Will: Discussed advance care planning with patient; information given to patient to review.      Do you have a current opioid prescription? no  Do you use any other controlled substances or medications that are not prescribed by a provider?  no            5/3/2024   General Health   How would you rate your overall physical health? Good   Feel stress (tense, anxious, or unable to sleep) Only a little   (!) STRESS CONCERN      5/3/2024   Nutrition   Three or more servings of calcium each day? Yes   Diet: Regular (no restrictions)   How many servings of fruit and vegetables per day? (!) 0-1   How many sweetened beverages each day? 0-1         5/3/2024   Exercise   Days per week of moderate/strenous exercise 5 days   Average minutes spent exercising at this level 60 min         5/3/2024   Social Factors   Frequency of gathering with friends or relatives Once a week   Worry food won't last until get money to buy more No   Food not last or not have enough money for food? No   Do you have housing?  Yes   Are you worried about losing your housing? No   Lack of transportation? No   Unable to get utilities (heat,electricity)? No         5/3/2024   Dental   Dentist two times every year? Yes         5/3/2024   TB Screening   Were you born outside of the [place]? No         Today's PHQ-2 Score:       5/3/2024     9:53 AM   PHQ-2 ( 1999 [employer])   Q1: Little interest or pleasure in doing things 0   Q2: Feeling down, depressed or hopeless 0   PHQ-2 Score 0   Q1: Little interest or pleasure in doing things Not at all   Q2: Feeling down, depressed or hopeless Not at all   PHQ-2 Score 0           5/3/2024   Substance Use   Alcohol more than 3/day or more than 7/wk Not Applicable   Do you use any other substances recreationally? No     Social History     Tobacco Use    Smoking status: Never    Smokeless tobacco: Never   Vaping Use    Vaping status: Never Used  "  Substance Use Topics    Alcohol use: No           5/3/2024   STI Screening   New sexual partner(s) since last STI/HIV test? No   ASCVD Risk   The 10-year ASCVD risk score (Carmenza MARIN, et al., 2019) is: 1.9%    Values used to calculate the score:      Age: 48 years      Sex: Male      Is Non- : No      Diabetic: No      Tobacco smoker: No      Systolic Blood Pressure: 122 mmHg      Is BP treated: No      HDL Cholesterol: 55 mg/dL      Total Cholesterol: 161 mg/dL        5/3/2024   Contraception/Family Planning   Questions about contraception or family planning No        Reviewed and updated as needed this visit by Provider                        Complete review of systems is obtained.  Other than the specific considerations noted above complete review of systems is negative.       Objective    Exam  /74   Pulse 63   Temp 98  F (36.7  C)   Resp 18   Ht 1.778 m (5' 10\")   Wt 104.8 kg (231 lb)   SpO2 98%   BMI 33.15 kg/m     Estimated body mass index is 33.15 kg/m  as calculated from the following:    Height as of this encounter: 1.778 m (5' 10\").    Weight as of this encounter: 104.8 kg (231 lb).    Wt Readings from Last 3 Encounters:   05/03/24 104.8 kg (231 lb)   05/02/23 106.6 kg (235 lb)   01/31/22 105.7 kg (233 lb 1.6 oz)        BP Readings from Last 6 Encounters:   05/03/24 122/74   05/02/23 122/74   01/31/22 130/74   11/20/21 (!) 136/95   01/15/21 112/68        Physical Exam        General Appearance:    Alert, cooperative, no distress   Eyes:   No scleral icterus or conjunctival irritation       Ears:    Normal TM's and external ear canals, both ears   Throat:   Lips, mucosa, and tongue normal; teeth and gums normal   Neck:   Supple, symmetrical, trachea midline, no adenopathy;        thyroid:  No enlargement/tenderness/nodules   Lungs:     Clear to auscultation bilaterally, respirations unlabored, no wheezes or crackles   Heart:    Regular rate and rhythm,  No " murmur   Abdomen:    Soft, no distention, no tenderness on palpation, no masses, no organomegaly     Extremities:  No edema, no joint swelling or redness, no evidence of any injuries   Skin:  No concerning skin findings, no suspicious moles, no rashes   Neurologic:  On gross examination there is no motor or sensory deficit.  Patient walks with a normal gait               Signed Electronically by: Sundar Vickers MD, MD

## 2024-05-04 LAB
CHOLEST SERPL-MCNC: 157 MG/DL
FASTING STATUS PATIENT QL REPORTED: YES
FASTING STATUS PATIENT QL REPORTED: YES
GLUCOSE SERPL-MCNC: 89 MG/DL (ref 70–99)
HDLC SERPL-MCNC: 55 MG/DL
LDLC SERPL CALC-MCNC: 88 MG/DL
NONHDLC SERPL-MCNC: 102 MG/DL
TRIGL SERPL-MCNC: 72 MG/DL

## 2025-04-03 ENCOUNTER — PATIENT OUTREACH (OUTPATIENT)
Dept: CARE COORDINATION | Facility: CLINIC | Age: 50
End: 2025-04-03
Payer: COMMERCIAL

## 2025-04-12 DIAGNOSIS — F41.9 ANXIETY AND DEPRESSION: ICD-10-CM

## 2025-04-12 DIAGNOSIS — F32.A ANXIETY AND DEPRESSION: ICD-10-CM

## 2025-04-14 ENCOUNTER — OFFICE VISIT (OUTPATIENT)
Dept: FAMILY MEDICINE | Facility: CLINIC | Age: 50
End: 2025-04-14
Payer: COMMERCIAL

## 2025-04-14 VITALS
TEMPERATURE: 98 F | BODY MASS INDEX: 33.64 KG/M2 | HEIGHT: 70 IN | HEART RATE: 66 BPM | OXYGEN SATURATION: 98 % | DIASTOLIC BLOOD PRESSURE: 76 MMHG | SYSTOLIC BLOOD PRESSURE: 126 MMHG | WEIGHT: 235 LBS | RESPIRATION RATE: 20 BRPM

## 2025-04-14 DIAGNOSIS — Z00.00 ROUTINE GENERAL MEDICAL EXAMINATION AT A HEALTH CARE FACILITY: Primary | ICD-10-CM

## 2025-04-14 DIAGNOSIS — Z12.11 COLON CANCER SCREENING: ICD-10-CM

## 2025-04-14 LAB
CHOLEST SERPL-MCNC: 179 MG/DL
FASTING STATUS PATIENT QL REPORTED: YES
FASTING STATUS PATIENT QL REPORTED: YES
GLUCOSE SERPL-MCNC: 93 MG/DL (ref 70–99)
HDLC SERPL-MCNC: 57 MG/DL
HOLD SPECIMEN: NORMAL
LDLC SERPL CALC-MCNC: 110 MG/DL
NONHDLC SERPL-MCNC: 122 MG/DL
PSA SERPL DL<=0.01 NG/ML-MCNC: 1.01 NG/ML (ref 0–3.5)
TRIGL SERPL-MCNC: 62 MG/DL

## 2025-04-14 PROCEDURE — 36415 COLL VENOUS BLD VENIPUNCTURE: CPT | Performed by: FAMILY MEDICINE

## 2025-04-14 PROCEDURE — 3078F DIAST BP <80 MM HG: CPT | Performed by: FAMILY MEDICINE

## 2025-04-14 PROCEDURE — 80061 LIPID PANEL: CPT | Performed by: FAMILY MEDICINE

## 2025-04-14 PROCEDURE — G0103 PSA SCREENING: HCPCS | Performed by: FAMILY MEDICINE

## 2025-04-14 PROCEDURE — 99396 PREV VISIT EST AGE 40-64: CPT | Performed by: FAMILY MEDICINE

## 2025-04-14 PROCEDURE — 3074F SYST BP LT 130 MM HG: CPT | Performed by: FAMILY MEDICINE

## 2025-04-14 PROCEDURE — 82947 ASSAY GLUCOSE BLOOD QUANT: CPT | Performed by: FAMILY MEDICINE

## 2025-04-14 RX ORDER — CITALOPRAM HYDROBROMIDE 20 MG/1
20 TABLET ORAL
Qty: 90 TABLET | Refills: 3 | Status: SHIPPED | OUTPATIENT
Start: 2025-04-14

## 2025-04-14 SDOH — HEALTH STABILITY: PHYSICAL HEALTH: ON AVERAGE, HOW MANY DAYS PER WEEK DO YOU ENGAGE IN MODERATE TO STRENUOUS EXERCISE (LIKE A BRISK WALK)?: 5 DAYS

## 2025-04-14 ASSESSMENT — ANXIETY QUESTIONNAIRES
6. BECOMING EASILY ANNOYED OR IRRITABLE: SEVERAL DAYS
GAD7 TOTAL SCORE: 1
5. BEING SO RESTLESS THAT IT IS HARD TO SIT STILL: NOT AT ALL
1. FEELING NERVOUS, ANXIOUS, OR ON EDGE: NOT AT ALL
7. FEELING AFRAID AS IF SOMETHING AWFUL MIGHT HAPPEN: NOT AT ALL
2. NOT BEING ABLE TO STOP OR CONTROL WORRYING: SEVERAL DAYS
7. FEELING AFRAID AS IF SOMETHING AWFUL MIGHT HAPPEN: NOT AT ALL
IF YOU CHECKED OFF ANY PROBLEMS ON THIS QUESTIONNAIRE, HOW DIFFICULT HAVE THESE PROBLEMS MADE IT FOR YOU TO DO YOUR WORK, TAKE CARE OF THINGS AT HOME, OR GET ALONG WITH OTHER PEOPLE: NOT DIFFICULT AT ALL
GAD7 TOTAL SCORE: 4
7. FEELING AFRAID AS IF SOMETHING AWFUL MIGHT HAPPEN: SEVERAL DAYS
3. WORRYING TOO MUCH ABOUT DIFFERENT THINGS: SEVERAL DAYS
3. WORRYING TOO MUCH ABOUT DIFFERENT THINGS: NOT AT ALL
2. NOT BEING ABLE TO STOP OR CONTROL WORRYING: NOT AT ALL
GAD7 TOTAL SCORE: 1
1. FEELING NERVOUS, ANXIOUS, OR ON EDGE: NOT AT ALL
IF YOU CHECKED OFF ANY PROBLEMS ON THIS QUESTIONNAIRE, HOW DIFFICULT HAVE THESE PROBLEMS MADE IT FOR YOU TO DO YOUR WORK, TAKE CARE OF THINGS AT HOME, OR GET ALONG WITH OTHER PEOPLE: NOT DIFFICULT AT ALL
6. BECOMING EASILY ANNOYED OR IRRITABLE: SEVERAL DAYS
4. TROUBLE RELAXING: NOT AT ALL
5. BEING SO RESTLESS THAT IT IS HARD TO SIT STILL: NOT AT ALL
4. TROUBLE RELAXING: NOT AT ALL
8. IF YOU CHECKED OFF ANY PROBLEMS, HOW DIFFICULT HAVE THESE MADE IT FOR YOU TO DO YOUR WORK, TAKE CARE OF THINGS AT HOME, OR GET ALONG WITH OTHER PEOPLE?: NOT DIFFICULT AT ALL
GAD7 TOTAL SCORE: 1

## 2025-04-14 ASSESSMENT — PATIENT HEALTH QUESTIONNAIRE - PHQ9
SUM OF ALL RESPONSES TO PHQ QUESTIONS 1-9: 3
10. IF YOU CHECKED OFF ANY PROBLEMS, HOW DIFFICULT HAVE THESE PROBLEMS MADE IT FOR YOU TO DO YOUR WORK, TAKE CARE OF THINGS AT HOME, OR GET ALONG WITH OTHER PEOPLE: SOMEWHAT DIFFICULT
SUM OF ALL RESPONSES TO PHQ QUESTIONS 1-9: 3
SUM OF ALL RESPONSES TO PHQ QUESTIONS 1-9: 3

## 2025-04-14 ASSESSMENT — SOCIAL DETERMINANTS OF HEALTH (SDOH): HOW OFTEN DO YOU GET TOGETHER WITH FRIENDS OR RELATIVES?: ONCE A WEEK

## 2025-04-14 NOTE — PROGRESS NOTES
Preventive Care Visit  [facility]  Sundar Vickers MD Family Medicine  Apr 14, 2025      Jaylan was seen today for recheck medication, physical and urinary problem.    Diagnoses and all orders for this visit:    Routine general medical examination at a health care facility  -     Lipid panel reflex to direct LDL Fasting; Future  -     PSA, screen; Future  -     Glucose; Future  -     Extra Tube; Future  -     Lipid panel reflex to direct LDL Fasting  -     PSA, screen  -     Glucose  -     Extra Tube    Colon cancer screening  -     Colonoscopy Screening  Referral; Future  -     Extra Tube; Future  -     Extra Tube           Subjective   Jaylan is a 49 year old, presenting for the following:  Recheck Medication, Physical, and Urinary Problem    He is generally healthy.  He has favorable cholesterol numbers.  Blood pressure is controlled.  Overall vascular disease risk is low.  Body mass index slightly higher than ideal.  Does get regular exercise as a physical job.  Sometimes gets soreness more so especially in the shoulders and neck region.  Currently dealing with some tendinitis in the right elbow.  Feels he has ways of managing this.  No significant current concern.  Does note some dribbling with urination but otherwise overall no significant urinary symptoms.  What he describes likely secondary to combination of factors but probably contribution from enlarged prostate.  Caffeine intake may be a factor as well.  He takes daily Prandin for management of mental health concerns.  Citalopram works generally well.  Reports the winter months from November through January are particularly the worst but overall feels he is managing well and does not need any change to treatment at this time.  Reviewed appropriate immunizations.  Discussed prostate cancer screening and colon cancer screening today.  Discussed routine dental care and eye care.          4/14/2025     9:07 AM   Additional  Questions   Roomed by am [facility]      Do you have a current opioid prescription? no  Do you use any other controlled substances or medications that are not prescribed by a provider?  no          Advance Care Planning  Patient does not have a Health Care Directive: Advance Directive received and scanned. Click on Code in the patient header to view.      4/14/2025   General Health   How would you rate your overall physical health? Good   Feel stress (tense, anxious, or unable to sleep) Only a little   (!) STRESS CONCERN      4/14/2025   Nutrition   Three or more servings of calcium each day? Yes   Diet: Regular (no restrictions)   How many servings of fruit and vegetables per day? (!) 0-1   How many sweetened beverages each day? 0-1         4/14/2025   Exercise   Days per week of moderate/strenous exercise 5 days         4/14/2025   Social Factors   Frequency of gathering with friends or relatives Once a week   Worry food won't last until get money to buy more No   Food not last or not have enough money for food? No   Do you have housing? (Housing is defined as stable permanent housing and does not include staying ouside in a car, in a tent, in an abandoned building, in an overnight shelter, or couch-surfing.) Yes   Are you worried about losing your housing? No   Lack of transportation? No   Unable to get utilities (heat,electricity)? No         4/14/2025   Fall Risk   Fallen 2 or more times in the past year? No   Trouble with walking or balance? No          4/14/2025   Dental   Dentist two times every year? Yes           5/3/2024   TB Screening   Were you born outside of the [place]? No         Today's PHQ-9 Score:       4/14/2025     9:12 AM   PHQ-9 SCORE   PHQ-9 Total Score MyChart 3 (Minimal depression)   PHQ-9 Total Score 3        Proxy-reported         4/14/2025   Substance Use   Alcohol more than 3/day or more than 7/wk Not Applicable   Do you use any other substances recreationally? No     Social History     Tobacco  "Use    Smoking status: Never    Smokeless tobacco: Never   Vaping Use    Vaping status: Never Used   Substance Use Topics    Alcohol use: No           4/14/2025   STI Screening   New sexual partner(s) since last STI/HIV test? No   ASCVD Risk   The 10-year ASCVD risk score (Carmenza MARIN, et al., 2019) is: 2.2%    Values used to calculate the score:      Age: 49 years      Sex: Male      Is Non- : No      Diabetic: No      Tobacco smoker: No      Systolic Blood Pressure: 126 mmHg      Is BP treated: No      HDL Cholesterol: 55 mg/dL      Total Cholesterol: 157 mg/dL            4/14/2025   Contraception/Family Planning   Questions about contraception or family planning No        Reviewed and updated as needed this visit by Provider                    Complete review of systems is obtained.  Other than the specific considerations noted above complete review of systems is negative.       Objective    Exam  /76   Pulse 66   Temp 98  F (36.7  C)   Resp 20   Ht 1.778 m (5' 10\")   Wt 106.6 kg (235 lb)   SpO2 98%   BMI 33.72 kg/m     Estimated body mass index is 33.72 kg/m  as calculated from the following:    Height as of this encounter: 1.778 m (5' 10\").    Weight as of this encounter: 106.6 kg (235 lb).    Wt Readings from Last 3 Encounters:   04/14/25 106.6 kg (235 lb)   05/03/24 104.8 kg (231 lb)   05/02/23 106.6 kg (235 lb)        BP Readings from Last 6 Encounters:   04/14/25 126/76   05/03/24 122/74   05/02/23 122/74   01/31/22 130/74   11/20/21 (!) 136/95   01/15/21 112/68            5/2/2023    11:05 AM 4/14/2025     9:00 AM 4/14/2025     9:12 AM   PHQ   PHQ-9 Total Score 1 3  3    Q9: Thoughts of better off dead/self-harm past 2 weeks Not at all  Not at all Not at all        Patient-reported    Proxy-reported        Physical Exam    General Appearance:    Alert, cooperative, no distress   Eyes:   No scleral icterus or conjunctival irritation       Ears:    Normal TM's and " external ear canals, both ears   Throat:   Lips, mucosa, and tongue normal; teeth and gums normal   Neck:   Supple, symmetrical, trachea midline, no adenopathy;        thyroid:  No enlargement/tenderness/nodules   Lungs:     Clear to auscultation bilaterally, respirations unlabored, no wheezes or crackles   Heart:    Regular rate and rhythm,  No murmur   Abdomen:    Soft, no distention, no tenderness on palpation, no masses, no organomegaly     Extremities:  No edema, no joint swelling or redness, no evidence of any injuries   Skin:  No concerning skin findings, no suspicious moles, no rashes   Neurologic:  On gross examination there is no motor or sensory deficit.  Patient walks with a normal gait       Signed Electronically by: Sundar Vickers MD    Answers submitted by the patient for this visit:  Patient Health Questionnaire (Submitted on 4/14/2025)  If you checked off any problems, how difficult have these problems made it for you to do your work, take care of things at home, or get along with other people?: Somewhat difficult  PHQ9 TOTAL SCORE: 3  Depression / Anxiety Questionnaire (Submitted on 4/14/2025)  Chief Complaint: Chronic problems general questions HPI Form  Depression/Anxiety: Depression & Anxiety  Depression & Anxiety (Submitted on 4/14/2025)  Chief Complaint: Chronic problems general questions HPI Form  Status since last visit:: no change  Anxiety since last: : no change  Other associated symptoms of depression:: No  Other associated symotome: : No  Significant life event: : grief or loss  Anxious:: No  Current substance use:: No  General Questionnaire (Submitted on 4/14/2025)  Chief Complaint: Chronic problems general questions HPI Form  How many servings of fruits and vegetables do you eat daily?: 0-1  On average, how many sweetened beverages do you drink each day (Examples: soda, juice, sweet tea, etc.  Do NOT count diet or artificially sweetened beverages)?: 0  How many minutes a day do you  exercise enough to make your heart beat faster?: 30 to 60  How many days a week do you exercise enough to make your heart beat faster?: 5  How many days per week do you miss taking your medication?: 0  Questionnaire about: Chronic problems general questions HPI Form (Submitted on 4/14/2025)  Chief Complaint: Chronic problems general questions HPI Form

## 2025-04-14 NOTE — LETTER
April 17, 2025      Jaylan Syed  [address] [place] [id number]        Dear ,    The LDL cholesterol otherwise known as the bad cholesterol is mildly elevated compared to previous test results.  Even with this mild elevation your risk of vascular disease remains very low.  Continue to work to eat a healthy balanced diet and to get regular exercise.  No need to consider any medication for cholesterol.     The blood sugar, called glucose is ideal there is no sign of diabetes.     PSA test for prostate cancer screening is well within the normal range.  There is no concern for prostate cancer.     If you have any questions about test results please let me know     Resulted Orders   Lipid panel reflex to direct LDL Fasting   Result Value Ref Range    Cholesterol 179 <200 mg/dL    Triglycerides 62 <150 mg/dL    Direct Measure HDL 57 >=40 mg/dL    LDL Cholesterol Calculated 110 (H) <100 mg/dL    Non HDL Cholesterol 122 <130 mg/dL    Patient Fasting > 8hrs? Yes     Narrative    Cholesterol  Desirable: < 200 mg/dL  Borderline High: 200 - 239 mg/dL  High: >= 240 mg/dL    Triglycerides  Normal: < 150 mg/dL  Borderline High: 150 - 199 mg/dL  High: 200-499 mg/dL  Very High: >= 500 mg/dL    Direct Measure HDL  Female: >= 50 mg/dL   Male: >= 40 mg/dL    LDL Cholesterol  Desirable: < 100 mg/dL  Above Desirable: 100 - 129 mg/dL   Borderline High: 130 - 159 mg/dL   High:  160 - 189 mg/dL   Very High: >= 190 mg/dL    Non HDL Cholesterol  Desirable: < 130 mg/dL  Above Desirable: 130 - 159 mg/dL  Borderline High: 160 - 189 mg/dL  High: 190 - 219 mg/dL  Very High: >= 220 mg/dL   PSA, screen   Result Value Ref Range    Prostate Specific Antigen Screen 1.01 0.00 - 3.50 ng/mL    Narrative    This result is obtained using the Roche Elecsys total PSA method on the barb e801 immunoassay analyzer, which is an ultrasensitive method. Results obtained with different assay methods or kits cannot be used interchangeably.  This test is  intended for initial prostate cancer screening. PSA values exceeding the age-specific limits are suspicious for prostate disease, but additional testing, such as prostate biopsy, is needed to diagnose prostate pathology. The American Cancer Society recommends annual examination with digital rectal examination and serum PSA beginning at age 50 and for men with a life expectancy of at least 10 years after detection of prostate cancer. For men in high-risk groups, such as  Americans or men with a first-degree relative diagnosed at a younger age, testing should begin at a younger age. It is generally recommended that information be provided to patients about the benefits and limitations of testing and treatment so they can make informed decisions.   Glucose   Result Value Ref Range    Glucose 93 70 - 99 mg/dL    Patient Fasting > 8hrs? Yes        If you have any questions or concerns, please call the clinic at the number listed above.       Sincerely,      Sundar Vickers MD    Electronically signed

## 2025-04-17 ENCOUNTER — PATIENT OUTREACH (OUTPATIENT)
Dept: CARE COORDINATION | Facility: CLINIC | Age: 50
End: 2025-04-17
Payer: COMMERCIAL

## 2025-05-29 ENCOUNTER — TRANSFERRED RECORDS (OUTPATIENT)
Dept: ADMINISTRATIVE | Facility: CLINIC | Age: 50
End: 2025-05-29
Payer: COMMERCIAL

## 2025-06-03 NOTE — PROCEDURES
[facility]   [address], [address], [place] [address]     Patient Name: Tristan Syed  Gender:  Male  Exam Date: 05/29/2025 Visit Number:  [id number]  Age: 49 Years YOB: 1975  Attending MD: Bebo Sarkar MD Medical Record#:  [id number]    Procedure: Colonoscopy   Indications: Colorectal cancer screening      Referring MD: Sundar Vickers MD  Primary MD:      Sundar Vickers MD  Medications: Admitting Medications:   0.9% Normal Saline at TKO   Intra Procedure Medications:   Patient received monitored anesthesia care.     Complications: No immediate complications  ______________________________________________________________________________  Procedure:   An examination of the heart and lungs was performed and found to be within acceptable limits.  .  The patient was therefore deemed a reasonable candidate for endoscopy and sedation.   The risks and benefits of the procedure were explained to the patient.After obtaining informed consent, the patient received monitored anesthesia care and I passed the scope   without difficulty via the rectum to the ileum.  The appendiceal orifice and ic valve were identified.  The scope was retroflexed during the examination  The quality of the prep was good  (Miralax/Gatorade/2 tablets Bisacodyl/Magnesium Citrate).    This was a complete examination throughout the entire colon.    Findings:    Normal finding.  Location - ileum.    Polyp location: sigmoid.  Quantity: 1.  Size: 3 mm.  Polyp shape:  sessile.         Maneuver: polypectomy was performed with a cold snare.       Removal:  complete.  Retrieval: complete.  Bleeding: none.    Diverticulosis.  Location: - sigmoid.        Quantity:  several.  No inflammation present.    Hemorrhoids.  Internal hemorrhoids without bleeding.    Impression:  Polyp of colon, unspecified part of colon, unspecified type  Diverticulosis of colon without diverticulitis  Hemorrhoids, internal  MD impression  comments:    -Await pathology results.  -Drink at least 64 oz water daily.  -Start Metamucil or Citrucel 1 tbsp mixed in 8 oz liquid once daily.  Internal hemorrhoids were found on your exam.  Please review the information packet provided to you.  If you are having symptoms and would like to consider treatment, please let us know.    Preliminary Plan:  The patient and their physician will receive a copy of the pathology report as well as pathology-based recommendations for future screening or surveillance.  Pathology Results:  A: COLON, SIGMOID, POLYP:           1. Hyperplastic polyp      MICROSCOPIC  A: Performed     Electronically signed by: Milo London MD    Interpreted at [facility], [address] [place] [id number]    Orders    Instruction(s)/Education:  Instruction/Education Timeframe Assessment   Colon Polyps  K57.30   Diverticulosis/Diverticulitis  K57.30   Hemorrhoids  K57.30     Patient Education given to patient:  Colon:  Hemorrhoids  Hemorrhoid Banding    Final Plan:  Repeat colonoscopy in 10 years for screening. If you have signs or symptoms of lower GI illness or a new diagnosis of colon cancer in an immediate family member, you should contact [facility] or your  primary provider to discuss whether your next exam should be repeated sooner.    We will attempt to contact you at appropriate intervals via [place] mail.  We may not be able to find you or contact you at that time, therefore you should know that the responsibility for following our recommendation rests with you.  If you don't hear from us at the time your procedure is due, please contact our office to schedule an appointment.  If your contact information should change, please contact our office so that we can update your record.      _Electronically signed by:___________________  Bebo Sarkar MD                 05/29/2025    cc: Sundar Vickers MD  cc: Sundar Vickers MD